# Patient Record
Sex: MALE | Race: ASIAN | NOT HISPANIC OR LATINO | Employment: OTHER | URBAN - METROPOLITAN AREA
[De-identification: names, ages, dates, MRNs, and addresses within clinical notes are randomized per-mention and may not be internally consistent; named-entity substitution may affect disease eponyms.]

---

## 2021-08-21 ENCOUNTER — CLINICAL SUPPORT (OUTPATIENT)
Dept: URGENT CARE | Facility: CLINIC | Age: 56
End: 2021-08-21
Payer: COMMERCIAL

## 2021-08-21 DIAGNOSIS — Z11.52 ENCOUNTER FOR SCREENING FOR COVID-19: Primary | ICD-10-CM

## 2021-08-21 LAB
CTP QC/QA: YES
SARS-COV-2 RDRP RESP QL NAA+PROBE: NEGATIVE

## 2021-08-21 PROCEDURE — 99211 PR OFFICE/OUTPT VISIT, EST, LEVL I: ICD-10-PCS | Mod: S$GLB,CS,, | Performed by: NURSE PRACTITIONER

## 2021-08-21 PROCEDURE — U0002: ICD-10-PCS | Mod: QW,S$GLB,, | Performed by: NURSE PRACTITIONER

## 2021-08-21 PROCEDURE — U0002 COVID-19 LAB TEST NON-CDC: HCPCS | Mod: QW,S$GLB,, | Performed by: NURSE PRACTITIONER

## 2021-08-21 PROCEDURE — 99211 OFF/OP EST MAY X REQ PHY/QHP: CPT | Mod: S$GLB,CS,, | Performed by: NURSE PRACTITIONER

## 2021-09-22 DIAGNOSIS — I34.0 MITRAL VALVE REGURGITATION: Primary | ICD-10-CM

## 2022-01-05 ENCOUNTER — LAB VISIT (OUTPATIENT)
Dept: PRIMARY CARE CLINIC | Facility: OTHER | Age: 57
End: 2022-01-05
Attending: INTERNAL MEDICINE
Payer: COMMERCIAL

## 2022-01-05 DIAGNOSIS — Z20.822 ENCOUNTER FOR LABORATORY TESTING FOR COVID-19 VIRUS: ICD-10-CM

## 2022-01-05 PROCEDURE — U0003 INFECTIOUS AGENT DETECTION BY NUCLEIC ACID (DNA OR RNA); SEVERE ACUTE RESPIRATORY SYNDROME CORONAVIRUS 2 (SARS-COV-2) (CORONAVIRUS DISEASE [COVID-19]), AMPLIFIED PROBE TECHNIQUE, MAKING USE OF HIGH THROUGHPUT TECHNOLOGIES AS DESCRIBED BY CMS-2020-01-R: HCPCS | Mod: ST72 | Performed by: INTERNAL MEDICINE

## 2022-01-10 LAB — SARS-COV-2 RNA RESP QL NAA+PROBE: NOT DETECTED

## 2022-12-16 DIAGNOSIS — I34.0 MITRAL VALVE INSUFFICIENCY: Primary | ICD-10-CM

## 2023-03-01 ENCOUNTER — OFFICE VISIT (OUTPATIENT)
Dept: CARDIOLOGY | Facility: CLINIC | Age: 58
End: 2023-03-01
Payer: COMMERCIAL

## 2023-03-01 VITALS
WEIGHT: 177.94 LBS | HEART RATE: 97 BPM | DIASTOLIC BLOOD PRESSURE: 80 MMHG | HEIGHT: 66 IN | OXYGEN SATURATION: 95 % | SYSTOLIC BLOOD PRESSURE: 124 MMHG | BODY MASS INDEX: 28.6 KG/M2

## 2023-03-01 DIAGNOSIS — I34.0 MITRAL VALVE INSUFFICIENCY: ICD-10-CM

## 2023-03-01 DIAGNOSIS — I10 PRIMARY HYPERTENSION: ICD-10-CM

## 2023-03-01 DIAGNOSIS — R94.31 ABNORMAL EKG: ICD-10-CM

## 2023-03-01 DIAGNOSIS — Z87.891 EX-SMOKER: ICD-10-CM

## 2023-03-01 DIAGNOSIS — I25.9 CHEST PAIN DUE TO MYOCARDIAL ISCHEMIA, UNSPECIFIED ISCHEMIC CHEST PAIN TYPE: ICD-10-CM

## 2023-03-01 DIAGNOSIS — E78.2 MIXED HYPERLIPIDEMIA: ICD-10-CM

## 2023-03-01 PROCEDURE — 3008F BODY MASS INDEX DOCD: CPT | Mod: CPTII,S$GLB,, | Performed by: INTERNAL MEDICINE

## 2023-03-01 PROCEDURE — 99204 OFFICE O/P NEW MOD 45 MIN: CPT | Mod: S$GLB,,, | Performed by: INTERNAL MEDICINE

## 2023-03-01 PROCEDURE — 3079F PR MOST RECENT DIASTOLIC BLOOD PRESSURE 80-89 MM HG: ICD-10-PCS | Mod: CPTII,S$GLB,, | Performed by: INTERNAL MEDICINE

## 2023-03-01 PROCEDURE — 93000 ELECTROCARDIOGRAM COMPLETE: CPT | Mod: S$GLB,,, | Performed by: INTERNAL MEDICINE

## 2023-03-01 PROCEDURE — 1159F PR MEDICATION LIST DOCUMENTED IN MEDICAL RECORD: ICD-10-PCS | Mod: CPTII,S$GLB,, | Performed by: INTERNAL MEDICINE

## 2023-03-01 PROCEDURE — 1159F MED LIST DOCD IN RCRD: CPT | Mod: CPTII,S$GLB,, | Performed by: INTERNAL MEDICINE

## 2023-03-01 PROCEDURE — 93000 EKG 12-LEAD: ICD-10-PCS | Mod: S$GLB,,, | Performed by: INTERNAL MEDICINE

## 2023-03-01 PROCEDURE — 3008F PR BODY MASS INDEX (BMI) DOCUMENTED: ICD-10-PCS | Mod: CPTII,S$GLB,, | Performed by: INTERNAL MEDICINE

## 2023-03-01 PROCEDURE — 3074F PR MOST RECENT SYSTOLIC BLOOD PRESSURE < 130 MM HG: ICD-10-PCS | Mod: CPTII,S$GLB,, | Performed by: INTERNAL MEDICINE

## 2023-03-01 PROCEDURE — 3074F SYST BP LT 130 MM HG: CPT | Mod: CPTII,S$GLB,, | Performed by: INTERNAL MEDICINE

## 2023-03-01 PROCEDURE — 99999 PR PBB SHADOW E&M-EST. PATIENT-LVL III: CPT | Mod: PBBFAC,,, | Performed by: INTERNAL MEDICINE

## 2023-03-01 PROCEDURE — 99204 PR OFFICE/OUTPT VISIT, NEW, LEVL IV, 45-59 MIN: ICD-10-PCS | Mod: S$GLB,,, | Performed by: INTERNAL MEDICINE

## 2023-03-01 PROCEDURE — 3079F DIAST BP 80-89 MM HG: CPT | Mod: CPTII,S$GLB,, | Performed by: INTERNAL MEDICINE

## 2023-03-01 PROCEDURE — 99999 PR PBB SHADOW E&M-EST. PATIENT-LVL III: ICD-10-PCS | Mod: PBBFAC,,, | Performed by: INTERNAL MEDICINE

## 2023-03-01 RX ORDER — ATORVASTATIN CALCIUM 20 MG/1
20 TABLET, FILM COATED ORAL DAILY
COMMUNITY
Start: 2023-02-16

## 2023-03-01 RX ORDER — AMLODIPINE BESYLATE 5 MG/1
5 TABLET ORAL DAILY
COMMUNITY
Start: 2023-02-16

## 2023-03-01 RX ORDER — NITROGLYCERIN 0.4 MG/1
0.4 TABLET SUBLINGUAL EVERY 5 MIN PRN
Qty: 30 TABLET | Refills: 3 | Status: SHIPPED | OUTPATIENT
Start: 2023-03-01 | End: 2024-02-29

## 2023-03-01 NOTE — PATIENT INSTRUCTIONS
Start aspirin 81mg once a day.  Take nitroglycerin (1 tab under tongue every 5 minutes) as needed for chest pain.    Procedure: Coronary Angiogram  Procedure date: March 20, 2023  Procedure time: 8am    Arrive at the Outpatient Surgery Unit at [facility]  Nothing to eat or drink after midnight.  Take all morning medication with a sip of water.  If you are diabetic, do not take any diabetes medication the morning of the procedure.   Please make arrangements for a family member or friend to bring you home after the procedure. You will not be able to drive home.      If you have any questions, please call our office at [phone].

## 2023-03-01 NOTE — PROGRESS NOTES
Cardiology    3/1/2023  10:41 AM    Problem list  Patient Active Problem List   Diagnosis    Primary hypertension    Mixed hyperlipidemia    Ex-smoker    Chest pain due to myocardial ischemia    Abnormal EKG       CC:  Chest pain    HPI:  Patient is referred for evaluation of chest pain.  Patient stated that in 2021, he noticed that he started having chest tightness when he starts walking.  His chest tightness will improve if he takes a rest.  He has not seen a cardiologist.  He denies any pain at rest.  He also has chest tightness when he cuts his grass.  There is no family history for premature coronary disease.  He quit smoking 6 months ago.  He drinks alcohol on occasion.      Medications  Current Outpatient Medications   Medication Sig Dispense Refill    amLODIPine (NORVASC) 5 MG tablet Take 5 mg by mouth once daily.      atorvastatin (LIPITOR) 20 MG tablet Take 20 mg by mouth once daily.      nitroGLYCERIN (NITROSTAT) 0.4 MG SL tablet Place 1 tablet (0.4 mg total) under the tongue every 5 (five) minutes as needed for Chest pain. 30 tablet 3     No current facility-administered medications for this visit.      Prior to Admission medications    Medication Sig Start Date End Date Taking? Authorizing Provider   amLODIPine (NORVASC) 5 MG tablet Take 5 mg by mouth once daily. 2/16/23  Yes Historical Provider   atorvastatin (LIPITOR) 20 MG tablet Take 20 mg by mouth once daily. 2/16/23  Yes Historical Provider   nitroGLYCERIN (NITROSTAT) 0.4 MG SL tablet Place 1 tablet (0.4 mg total) under the tongue every 5 (five) minutes as needed for Chest pain. 3/1/23 2/29/24  Esequiel Zamarripa MD         History  No past medical history on file.  No past surgical history on file.  Social History     Socioeconomic History    Marital status:    Tobacco Use    Smoking status: Former     Types: Cigarettes    Smokeless tobacco: Never   Substance and Sexual Activity    Alcohol use: Yes     Comment: occ    Drug use: Never          Allergies  Review of patient's allergies indicates:  No Known Allergies      Review of Systems   Review of Systems   Constitutional: Negative for decreased appetite, fever and weight loss.   HENT:  Negative for congestion and nosebleeds.    Eyes:  Negative for double vision, vision loss in left eye, vision loss in right eye and visual disturbance.   Cardiovascular:  Positive for chest pain. Negative for claudication, cyanosis, dyspnea on exertion, irregular heartbeat, leg swelling, near-syncope, orthopnea, palpitations, paroxysmal nocturnal dyspnea and syncope.   Respiratory:  Negative for cough, hemoptysis, shortness of breath, sleep disturbances due to breathing, snoring, sputum production and wheezing.    Endocrine: Negative for cold intolerance and heat intolerance.   Skin:  Negative for nail changes and rash.   Musculoskeletal:  Negative for joint pain, muscle cramps, muscle weakness and myalgias.   Gastrointestinal:  Negative for change in bowel habit, heartburn, hematemesis, hematochezia, hemorrhoids and melena.   Neurological:  Negative for dizziness, focal weakness and headaches.       Physical Exam  Wt Readings from Last 1 Encounters:   03/01/23 80.7 kg (177 lb 14.6 oz)     BP Readings from Last 3 Encounters:   03/01/23 124/80     Pulse Readings from Last 1 Encounters:   03/01/23 97     Body mass index is 28.72 kg/m².    Physical Exam  Constitutional:       Appearance: He is well-developed.   HENT:      Head: Atraumatic.   Eyes:      General: No scleral icterus.  Neck:      Vascular: Normal carotid pulses. No carotid bruit, hepatojugular reflux or JVD.   Cardiovascular:      Rate and Rhythm: Normal rate and regular rhythm.      Chest Wall: PMI is not displaced.      Pulses: Intact distal pulses.           Carotid pulses are 2+ on the right side and 2+ on the left side.       Radial pulses are 2+ on the right side and 2+ on the left side.        Dorsalis pedis pulses are 2+ on the right side and 2+  on the left side.      Heart sounds: Normal heart sounds, S1 normal and S2 normal. No murmur heard.    No friction rub.   Pulmonary:      Effort: Pulmonary effort is normal. No respiratory distress.      Breath sounds: Normal breath sounds. No stridor. No wheezing or rales.   Chest:      Chest wall: No tenderness.   Abdominal:      General: Bowel sounds are normal.      Palpations: Abdomen is soft.   Musculoskeletal:      Cervical back: Neck supple. No edema.   Skin:     General: Skin is warm and dry.      Nails: There is no clubbing.   Neurological:      Mental Status: He is alert and oriented to person, place, and time.   Psychiatric:         Behavior: Behavior normal.         Thought Content: Thought content normal.           Assessment  1. Mitral valve insufficiency  Being assessed  - Ambulatory referral/consult to Cardiology    2. Primary hypertension  Well controlled on current medication, continue to monitor    3. Mixed hyperlipidemia  On statin    4. Ex-smoker  Unchanged    5. Chest pain due to myocardial ischemia, unspecified ischemic chest pain type  New onset chest pain on exertion relieved with rest is concerning for unstable angina  - EKG 12-lead  - Echo; Future    6. Abnormal EKG  ST and T-wave changes in inferior leads        Plan and Discussion  Discussed his multiple risk factors for coronary disease (male, HTN, HLP, smoker), his new onset exertional chest pain relieved with rest is concerning for unstable angina.  Discussed his EKG which showed sinus rhythm with T-wave inversion in inferior leads.  Discussed proceeding with coronary angiogram for unstable angina.  Patient understood and agreed.  The risks, benefits, indications and alternatives of the planned procedure were discussed in details.  Discussed with patient the risks and benefits of the procedure including, but not limited to, the following; 1:1000 risk of heart attack, stroke and death with 3-5% risk of bleeding, vessel damage (in the  arms, legs or in the heart), and the need for emergent surgery, including open heart surgery.  All questions were answered.  The patient agreed to proceed.  Patient will coordinate with his wife and let us know when he can proceed with an angiogram.  Recommend to start aspirin 81 mg daily.  Recommend to take 1 sublingual nitroglycerin as needed every 5 minutes for chest pain.  Get echo.      Follow Up  1 month      Esequiel Zamarripa MD, F.A.C.C, F.S.C.A.I.        40 minutes were spent in chart review, documentation and review of results, and evaluation, treatment, and counseling of patient on the same day of service.    Disclaimer: This document was created using voice recognition software ([employer]). Although it may be edited, this document may contain errors related to incorrect recognition of the spoken word. Please call the physician if clarification is needed.

## 2023-03-02 ENCOUNTER — HOSPITAL ENCOUNTER (OUTPATIENT)
Dept: CARDIOLOGY | Facility: OTHER | Age: 58
Discharge: HOME | End: 2023-03-02
Attending: INTERNAL MEDICINE
Payer: COMMERCIAL

## 2023-03-02 VITALS
BODY MASS INDEX: 28.45 KG/M2 | SYSTOLIC BLOOD PRESSURE: 124 MMHG | HEIGHT: 66 IN | WEIGHT: 177 LBS | DIASTOLIC BLOOD PRESSURE: 80 MMHG

## 2023-03-02 DIAGNOSIS — I25.9 CHEST PAIN DUE TO MYOCARDIAL ISCHEMIA, UNSPECIFIED ISCHEMIC CHEST PAIN TYPE: ICD-10-CM

## 2023-03-02 LAB
ASCENDING AORTA: 3.19 CM
AV INDEX (PROSTH): 0.93
AV MEAN GRADIENT: 3 MMHG
AV PEAK GRADIENT: 6 MMHG
AV VALVE AREA: 3.17 CM2
AV VELOCITY RATIO: 0.93
BSA FOR ECHO PROCEDURE: 1.93 M2
CV ECHO LV RWT: 0.38 CM
DOP CALC AO PEAK VEL: 1.23 M/S
DOP CALC AO VTI: 22.8 CM
DOP CALC LVOT AREA: 3.4 CM2
DOP CALC LVOT DIAMETER: 2.09 CM
DOP CALC LVOT PEAK VEL: 1.15 M/S
DOP CALC LVOT STROKE VOLUME: 72.35 CM3
DOP CALCLVOT PEAK VEL VTI: 21.1 CM
E WAVE DECELERATION TIME: 143.89 MSEC
E/A RATIO: 0.85
E/E' RATIO: 7.16 M/S
ECHO LV POSTERIOR WALL: 1 CM (ref 0.6–1.1)
EJECTION FRACTION: 68 %
FRACTIONAL SHORTENING: 38 % (ref 28–44)
INTERVENTRICULAR SEPTUM: 0.9 CM (ref 0.6–1.1)
IVC DIAMETER: 14 CM
IVRT: 122.26 MSEC
LA MAJOR: 5.29 CM
LA MINOR: 5.51 CM
LA WIDTH: 3.5 CM
LEFT ATRIUM SIZE: 4.44 CM
LEFT ATRIUM VOLUME INDEX MOD: 24.2 ML/M2
LEFT ATRIUM VOLUME INDEX: 37.5 ML/M2
LEFT ATRIUM VOLUME MOD: 46 CM3
LEFT ATRIUM VOLUME: 71.3 CM3
LEFT INTERNAL DIMENSION IN SYSTOLE: 3.22 CM (ref 2.1–4)
LEFT VENTRICLE DIASTOLIC VOLUME INDEX: 68.11 ML/M2
LEFT VENTRICLE DIASTOLIC VOLUME: 129.41 ML
LEFT VENTRICLE MASS INDEX: 95 G/M2
LEFT VENTRICLE SYSTOLIC VOLUME INDEX: 22 ML/M2
LEFT VENTRICLE SYSTOLIC VOLUME: 41.71 ML
LEFT VENTRICULAR INTERNAL DIMENSION IN DIASTOLE: 5.2 CM (ref 3.5–6)
LEFT VENTRICULAR MASS: 181.4 G
LV LATERAL E/E' RATIO: 6.8 M/S
LV SEPTAL E/E' RATIO: 7.56 M/S
LVOT MG: 2.66 MMHG
LVOT MV: 0.76 CM/S
MV PEAK A VEL: 0.8 M/S
MV PEAK E VEL: 0.68 M/S
MV STENOSIS PRESSURE HALF TIME: 41.73 MS
MV VALVE AREA P 1/2 METHOD: 5.27 CM2
PISA MRMAX VEL: 4.84 M/S
PISA TR MAX VEL: 2.89 M/S
PULM VEIN S/D RATIO: 1.47
PV PEAK D VEL: 0.38 M/S
PV PEAK S VEL: 0.56 M/S
PV PEAK VELOCITY: 1.11 CM/S
RA MAJOR: 4.4 CM
RA PRESSURE: 3 MMHG
RA WIDTH: 3.4 CM
RIGHT VENTRICULAR END-DIASTOLIC DIMENSION: 3.65 CM
RV TISSUE DOPPLER FREE WALL SYSTOLIC VELOCITY 1 (APICAL 4 CHAMBER VIEW): 14 CM/S
SINUS: 3.4 CM
STJ: 2.93 CM
TDI LATERAL: 0.1 M/S
TDI SEPTAL: 0.09 M/S
TDI: 0.1 M/S
TR MAX PG: 33 MMHG
TRICUSPID ANNULAR PLANE SYSTOLIC EXCURSION: 1.8 CM
TV REST PULMONARY ARTERY PRESSURE: 36 MMHG

## 2023-03-02 PROCEDURE — 93306 TTE W/DOPPLER COMPLETE: CPT | Mod: 26,,, | Performed by: INTERNAL MEDICINE

## 2023-03-02 PROCEDURE — 93306 TTE W/DOPPLER COMPLETE: CPT

## 2023-03-02 PROCEDURE — 93306 ECHO (CUPID ONLY): ICD-10-PCS | Mod: 26,,, | Performed by: INTERNAL MEDICINE

## 2023-03-23 DIAGNOSIS — I25.9 CHEST PAIN DUE TO MYOCARDIAL ISCHEMIA, UNSPECIFIED ISCHEMIC CHEST PAIN TYPE: Primary | ICD-10-CM

## 2023-03-23 DIAGNOSIS — Z01.818 PRE-OP TESTING: ICD-10-CM

## 2023-03-23 DIAGNOSIS — I25.10 CORONARY ARTERY DISEASE, UNSPECIFIED VESSEL OR LESION TYPE, UNSPECIFIED WHETHER ANGINA PRESENT, UNSPECIFIED WHETHER NATIVE OR TRANSPLANTED HEART: ICD-10-CM

## 2023-03-23 DIAGNOSIS — I25.10 CORONARY ARTERY DISEASE, UNSPECIFIED VESSEL OR LESION TYPE, UNSPECIFIED WHETHER ANGINA PRESENT, UNSPECIFIED WHETHER NATIVE OR TRANSPLANTED HEART: Primary | ICD-10-CM

## 2023-03-23 DIAGNOSIS — Z87.19 HISTORY OF CIRRHOSIS OF LIVER: ICD-10-CM

## 2023-03-24 ENCOUNTER — TELEPHONE (OUTPATIENT)
Dept: CARDIOTHORACIC SURGERY | Facility: CLINIC | Age: 58
End: 2023-03-24
Payer: COMMERCIAL

## 2023-03-24 NOTE — TELEPHONE ENCOUNTER
Called to notify pt that Dr. Mohna reviewed pt's CT scan from today and stated that pt does not need to have Carotid US and PFTs performed on 3/27 before his appt with Dr. Mohan.  Spoke with pt's wife and informed her of above which she verbalized understanding to.  Pt's wife also reminded of pt's appt with Dr. Mohan at 11:30 on 3/27, which she verbalized understanding to.

## 2023-03-27 ENCOUNTER — OFFICE VISIT (OUTPATIENT)
Dept: CARDIOTHORACIC SURGERY | Facility: CLINIC | Age: 58
End: 2023-03-27
Payer: COMMERCIAL

## 2023-03-27 VITALS
HEIGHT: 67 IN | OXYGEN SATURATION: 99 % | SYSTOLIC BLOOD PRESSURE: 130 MMHG | RESPIRATION RATE: 18 BRPM | DIASTOLIC BLOOD PRESSURE: 76 MMHG | WEIGHT: 175.63 LBS | HEART RATE: 74 BPM | BODY MASS INDEX: 27.56 KG/M2

## 2023-03-27 DIAGNOSIS — I20.0 UNSTABLE ANGINA PECTORIS: ICD-10-CM

## 2023-03-27 DIAGNOSIS — Z87.19 HISTORY OF CIRRHOSIS OF LIVER: Primary | ICD-10-CM

## 2023-03-27 PROCEDURE — 99999 PR PBB SHADOW E&M-EST. PATIENT-LVL III: CPT | Mod: PBBFAC,,, | Performed by: THORACIC SURGERY (CARDIOTHORACIC VASCULAR SURGERY)

## 2023-03-27 PROCEDURE — 3008F BODY MASS INDEX DOCD: CPT | Mod: CPTII,S$GLB,, | Performed by: THORACIC SURGERY (CARDIOTHORACIC VASCULAR SURGERY)

## 2023-03-27 PROCEDURE — 99999 PR PBB SHADOW E&M-EST. PATIENT-LVL III: ICD-10-PCS | Mod: PBBFAC,,, | Performed by: THORACIC SURGERY (CARDIOTHORACIC VASCULAR SURGERY)

## 2023-03-27 PROCEDURE — 1160F RVW MEDS BY RX/DR IN RCRD: CPT | Mod: CPTII,S$GLB,, | Performed by: THORACIC SURGERY (CARDIOTHORACIC VASCULAR SURGERY)

## 2023-03-27 PROCEDURE — 99205 PR OFFICE/OUTPT VISIT, NEW, LEVL V, 60-74 MIN: ICD-10-PCS | Mod: S$GLB,,, | Performed by: THORACIC SURGERY (CARDIOTHORACIC VASCULAR SURGERY)

## 2023-03-27 PROCEDURE — 3078F DIAST BP <80 MM HG: CPT | Mod: CPTII,S$GLB,, | Performed by: THORACIC SURGERY (CARDIOTHORACIC VASCULAR SURGERY)

## 2023-03-27 PROCEDURE — 3008F PR BODY MASS INDEX (BMI) DOCUMENTED: ICD-10-PCS | Mod: CPTII,S$GLB,, | Performed by: THORACIC SURGERY (CARDIOTHORACIC VASCULAR SURGERY)

## 2023-03-27 PROCEDURE — 3075F PR MOST RECENT SYSTOLIC BLOOD PRESS GE 130-139MM HG: ICD-10-PCS | Mod: CPTII,S$GLB,, | Performed by: THORACIC SURGERY (CARDIOTHORACIC VASCULAR SURGERY)

## 2023-03-27 PROCEDURE — 1159F MED LIST DOCD IN RCRD: CPT | Mod: CPTII,S$GLB,, | Performed by: THORACIC SURGERY (CARDIOTHORACIC VASCULAR SURGERY)

## 2023-03-27 PROCEDURE — 1160F PR REVIEW ALL MEDS BY PRESCRIBER/CLIN PHARMACIST DOCUMENTED: ICD-10-PCS | Mod: CPTII,S$GLB,, | Performed by: THORACIC SURGERY (CARDIOTHORACIC VASCULAR SURGERY)

## 2023-03-27 PROCEDURE — 3075F SYST BP GE 130 - 139MM HG: CPT | Mod: CPTII,S$GLB,, | Performed by: THORACIC SURGERY (CARDIOTHORACIC VASCULAR SURGERY)

## 2023-03-27 PROCEDURE — 3078F PR MOST RECENT DIASTOLIC BLOOD PRESSURE < 80 MM HG: ICD-10-PCS | Mod: CPTII,S$GLB,, | Performed by: THORACIC SURGERY (CARDIOTHORACIC VASCULAR SURGERY)

## 2023-03-27 PROCEDURE — 1159F PR MEDICATION LIST DOCUMENTED IN MEDICAL RECORD: ICD-10-PCS | Mod: CPTII,S$GLB,, | Performed by: THORACIC SURGERY (CARDIOTHORACIC VASCULAR SURGERY)

## 2023-03-27 PROCEDURE — 99205 OFFICE O/P NEW HI 60 MIN: CPT | Mod: S$GLB,,, | Performed by: THORACIC SURGERY (CARDIOTHORACIC VASCULAR SURGERY)

## 2023-03-27 NOTE — PROGRESS NOTES
Subjective:      Patient ID: Orlin Zamarripa is a 57 y.o. male.    Chief Complaint: No chief complaint on file.      HPI:  Orlin Zamarripa is a 57 y.o. male who presents for initial surgical evaluation for CAD. Medical conditions include cirrhosis ( 2013), HTN, HLD, ex-smoker. Patient endorse chest pain. Patient stated that in 2021, he noticed that he started having chest tightness when he starts walking.  His chest tightness will improve if he takes a rest. He denies any pain at rest.  He also has chest tightness when he cuts his grass.  With associated SOB, fatigue. Denies palpitations, LE swelling, orthopnea, presyncope and syncope. There is no family history for premature coronary disease.  He quit smoking 6 months ago.  He drinks alcohol on occasion.     Of note, patient has cirrhosis that was diagnoses in 2013. Reports stating that patient was on antiviral medications for  HVC but stopped after 10 months of therapy because he was out of medications. Since then has not followed back regarding issue. Recent CT scan showed cirrhosis. Patient and wife did not know he has cirrhosis, this is new news to them.        Family and social history reviewed    Review of patient's allergies indicates:  No Known Allergies  Past Medical History:   Diagnosis Date    Hypertension     Mixed hyperlipidemia      Past Surgical History:   Procedure Laterality Date    CARDIAC CATHETERIZATION  03/20/2023    CORONARY ANGIOGRAPHY Right 3/20/2023    Procedure: ANGIOGRAM, CORONARY ARTERY;  Surgeon: Esequiel Zamarripa MD;  Location: [facility] CATH LAB;  Service: Cardiology;  Laterality: Right;  radial     Family History    None       Social History     Socioeconomic History    Marital status:    Tobacco Use    Smoking status: Former     Types: Cigarettes    Smokeless tobacco: Never   Substance and Sexual Activity    Alcohol use: Not Currently     Comment: occ    Drug use: Never     Current Outpatient Medications on File Prior to Visit   Medication  Sig Dispense Refill    amLODIPine (NORVASC) 5 MG tablet Take 5 mg by mouth once daily.      aspirin (ECOTRIN) 81 MG EC tablet Take 81 mg by mouth once daily.      atorvastatin (LIPITOR) 20 MG tablet Take 20 mg by mouth once daily.      nitroGLYCERIN (NITROSTAT) 0.4 MG SL tablet Place 1 tablet (0.4 mg total) under the tongue every 5 (five) minutes as needed for Chest pain. 30 tablet 3     No current facility-administered medications on file prior to visit.       Current medications Reviewed    Review of Systems   Constitutional:  Positive for fatigue. Negative for activity change, appetite change and fever.   HENT:  Negative for nosebleeds.    Respiratory:  Positive for shortness of breath. Negative for cough.    Cardiovascular:  Positive for chest pain (discomfort/tightness). Negative for palpitations and leg swelling.   Gastrointestinal:  Negative for abdominal distention, abdominal pain and nausea.   Genitourinary:  Negative for frequency.   Musculoskeletal:  Negative for arthralgias and myalgias.   Skin:  Negative for rash.   Neurological:  Negative for dizziness and numbness.   Hematological:  Does not bruise/bleed easily.   Objective:   Physical Exam  Vitals reviewed.   Constitutional:       Appearance: Normal appearance.   HENT:      Head: Normocephalic and atraumatic.   Eyes:      Extraocular Movements: Extraocular movements intact.   Cardiovascular:      Rate and Rhythm: Normal rate and regular rhythm.      Heart sounds: Normal heart sounds.   Pulmonary:      Effort: Pulmonary effort is normal.      Breath sounds: Normal breath sounds.   Abdominal:      General: Abdomen is flat.      Palpations: Abdomen is soft.   Musculoskeletal:         General: Normal range of motion.      Cervical back: Normal range of motion.   Skin:     General: Skin is warm and dry.      Capillary Refill: Capillary refill takes less than 2 seconds.   Neurological:      General: No focal deficit present.      Mental Status: He is  alert.       Diagnostic Results:   CT c/a/p  No acute intrathoracic process.   Coronary artery calcifications.   CT abdomen/pelvis:   Findings of hepatic cirrhosis.  No discrete hepatic lesion.     Diverticulosis coli without evidence of acute diverticulitis.  [facility] 3/20/2023    The Prox RCA lesion was 100% stenosed.    The Mid Cx lesion was 70% stenosed.    The Lat 1st Mrg lesion was 80% stenosed.    The 1st Mrg lesion was 70% stenosed.    The Mid LAD lesion was 95% stenosed.    There was three vessel coronary artery disease.  Refer to CT surgery.    Echo 3/2/2023  Mild left atrial enlargement.  Normal central venous pressure (3 mmHg).  The estimated PA systolic pressure is 36 mmHg.  The left ventricle is normal in size with normal systolic function.  The estimated ejection fraction is 68%.  Indeterminate left ventricular diastolic function.  Normal right ventricular size with normal right ventricular systolic function.  LVIDD 5.2  Aortic sinus 3.4     Assessment:   CAD   Plan:   Patient not a surgical candidate due to history of cirrhosis. He was diagnoses with cirrhosis in 2013 and was treated on antiviral medication for HCV for 10 months?. Repeat CT can showed cirrhosis. Patient has not had hepatology f/u since 2013, will send referral to hepatology and send him to see Dr. Hinton for PCI.

## 2023-03-28 ENCOUNTER — TELEPHONE (OUTPATIENT)
Dept: HEPATOLOGY | Facility: CLINIC | Age: 58
End: 2023-03-28
Payer: COMMERCIAL

## 2023-03-28 NOTE — TELEPHONE ENCOUNTER
----- Message from Mimi Chan RN sent at 3/27/2023 12:27 PM CDT -----  Good afternoon,     Dr. Mohan saw Mr. Zamarripa this morning for CABG evaluation and due to his history of Hepatitis C, Dr. Mohan would like Mr. Zamarripa to be seen by Hepatology.  Can you please contact Mr. Zamarripa to schedule an appt as soon as possible?  A referral was entered into Epic.    Thank you,     Mimi Chan RN

## 2023-03-28 NOTE — TELEPHONE ENCOUNTER
Telephone call to patient and spoke with the patient wife. Scheduled hepatology appt on 5/31. Mailed appt reminder to patient.

## 2023-04-13 ENCOUNTER — DOCUMENTATION ONLY (OUTPATIENT)
Dept: CARDIOLOGY | Facility: CLINIC | Age: 58
End: 2023-04-13
Payer: COMMERCIAL

## 2023-04-13 ENCOUNTER — LAB VISIT (OUTPATIENT)
Dept: LAB | Facility: HOSPITAL | Age: 58
End: 2023-04-13
Payer: COMMERCIAL

## 2023-04-13 ENCOUNTER — OFFICE VISIT (OUTPATIENT)
Dept: CARDIOLOGY | Facility: CLINIC | Age: 58
End: 2023-04-13
Payer: COMMERCIAL

## 2023-04-13 VITALS
SYSTOLIC BLOOD PRESSURE: 131 MMHG | OXYGEN SATURATION: 98 % | DIASTOLIC BLOOD PRESSURE: 82 MMHG | HEIGHT: 67 IN | BODY MASS INDEX: 27.64 KG/M2 | HEART RATE: 66 BPM | WEIGHT: 176.13 LBS

## 2023-04-13 DIAGNOSIS — K74.60 HEPATIC CIRRHOSIS, UNSPECIFIED HEPATIC CIRRHOSIS TYPE, UNSPECIFIED WHETHER ASCITES PRESENT: ICD-10-CM

## 2023-04-13 DIAGNOSIS — B19.20 HEPATITIS C VIRUS INFECTION WITHOUT HEPATIC COMA, UNSPECIFIED CHRONICITY: ICD-10-CM

## 2023-04-13 DIAGNOSIS — D69.6 THROMBOCYTOPENIA: ICD-10-CM

## 2023-04-13 DIAGNOSIS — I10 PRIMARY HYPERTENSION: ICD-10-CM

## 2023-04-13 DIAGNOSIS — E78.2 MIXED HYPERLIPIDEMIA: ICD-10-CM

## 2023-04-13 DIAGNOSIS — I25.118 CORONARY ARTERY DISEASE OF NATIVE ARTERY OF NATIVE HEART WITH STABLE ANGINA PECTORIS: ICD-10-CM

## 2023-04-13 DIAGNOSIS — I25.118 CORONARY ARTERY DISEASE OF NATIVE ARTERY OF NATIVE HEART WITH STABLE ANGINA PECTORIS: Primary | ICD-10-CM

## 2023-04-13 LAB
ABO + RH BLD: NORMAL
ANION GAP SERPL CALC-SCNC: 8 MMOL/L (ref 8–16)
BASOPHILS # BLD AUTO: 0.01 K/UL (ref 0–0.2)
BASOPHILS NFR BLD: 0.3 % (ref 0–1.9)
BLD GP AB SCN CELLS X3 SERPL QL: NORMAL
BUN SERPL-MCNC: 16 MG/DL (ref 6–20)
CALCIUM SERPL-MCNC: 9.1 MG/DL (ref 8.7–10.5)
CHLORIDE SERPL-SCNC: 104 MMOL/L (ref 95–110)
CO2 SERPL-SCNC: 26 MMOL/L (ref 23–29)
CREAT SERPL-MCNC: 0.9 MG/DL (ref 0.5–1.4)
DIFFERENTIAL METHOD: ABNORMAL
EOSINOPHIL # BLD AUTO: 0.1 K/UL (ref 0–0.5)
EOSINOPHIL NFR BLD: 1.3 % (ref 0–8)
ERYTHROCYTE [DISTWIDTH] IN BLOOD BY AUTOMATED COUNT: 12.3 % (ref 11.5–14.5)
EST. GFR  (NO RACE VARIABLE): >60 ML/MIN/1.73 M^2
GLUCOSE SERPL-MCNC: 105 MG/DL (ref 70–110)
HCT VFR BLD AUTO: 42.4 % (ref 40–54)
HGB BLD-MCNC: 14.3 G/DL (ref 14–18)
IMM GRANULOCYTES # BLD AUTO: 0.01 K/UL (ref 0–0.04)
IMM GRANULOCYTES NFR BLD AUTO: 0.3 % (ref 0–0.5)
LYMPHOCYTES # BLD AUTO: 1.4 K/UL (ref 1–4.8)
LYMPHOCYTES NFR BLD: 35.5 % (ref 18–48)
MCH RBC QN AUTO: 31.9 PG (ref 27–31)
MCHC RBC AUTO-ENTMCNC: 33.7 G/DL (ref 32–36)
MCV RBC AUTO: 95 FL (ref 82–98)
MONOCYTES # BLD AUTO: 0.4 K/UL (ref 0.3–1)
MONOCYTES NFR BLD: 9.2 % (ref 4–15)
NEUTROPHILS # BLD AUTO: 2 K/UL (ref 1.8–7.7)
NEUTROPHILS NFR BLD: 53.4 % (ref 38–73)
NRBC BLD-RTO: 0 /100 WBC
PLATELET # BLD AUTO: 145 K/UL (ref 150–450)
PMV BLD AUTO: 9.2 FL (ref 9.2–12.9)
POTASSIUM SERPL-SCNC: 4.1 MMOL/L (ref 3.5–5.1)
RBC # BLD AUTO: 4.48 M/UL (ref 4.6–6.2)
SODIUM SERPL-SCNC: 138 MMOL/L (ref 136–145)
SPECIMEN OUTDATE: NORMAL
WBC # BLD AUTO: 3.8 K/UL (ref 3.9–12.7)

## 2023-04-13 PROCEDURE — 36415 COLL VENOUS BLD VENIPUNCTURE: CPT | Performed by: STUDENT IN AN ORGANIZED HEALTH CARE EDUCATION/TRAINING PROGRAM

## 2023-04-13 PROCEDURE — 1159F PR MEDICATION LIST DOCUMENTED IN MEDICAL RECORD: ICD-10-PCS | Mod: CPTII,S$GLB,, | Performed by: INTERNAL MEDICINE

## 2023-04-13 PROCEDURE — 3008F PR BODY MASS INDEX (BMI) DOCUMENTED: ICD-10-PCS | Mod: CPTII,S$GLB,, | Performed by: INTERNAL MEDICINE

## 2023-04-13 PROCEDURE — 3079F DIAST BP 80-89 MM HG: CPT | Mod: CPTII,S$GLB,, | Performed by: INTERNAL MEDICINE

## 2023-04-13 PROCEDURE — 1159F MED LIST DOCD IN RCRD: CPT | Mod: CPTII,S$GLB,, | Performed by: INTERNAL MEDICINE

## 2023-04-13 PROCEDURE — 3079F PR MOST RECENT DIASTOLIC BLOOD PRESSURE 80-89 MM HG: ICD-10-PCS | Mod: CPTII,S$GLB,, | Performed by: INTERNAL MEDICINE

## 2023-04-13 PROCEDURE — 80048 BASIC METABOLIC PNL TOTAL CA: CPT | Performed by: INTERNAL MEDICINE

## 2023-04-13 PROCEDURE — 3008F BODY MASS INDEX DOCD: CPT | Mod: CPTII,S$GLB,, | Performed by: INTERNAL MEDICINE

## 2023-04-13 PROCEDURE — 99214 PR OFFICE/OUTPT VISIT, EST, LEVL IV, 30-39 MIN: ICD-10-PCS | Mod: S$GLB,,, | Performed by: INTERNAL MEDICINE

## 2023-04-13 PROCEDURE — 3075F PR MOST RECENT SYSTOLIC BLOOD PRESS GE 130-139MM HG: ICD-10-PCS | Mod: CPTII,S$GLB,, | Performed by: INTERNAL MEDICINE

## 2023-04-13 PROCEDURE — 99999 PR PBB SHADOW E&M-EST. PATIENT-LVL III: CPT | Mod: PBBFAC,,, | Performed by: INTERNAL MEDICINE

## 2023-04-13 PROCEDURE — 3075F SYST BP GE 130 - 139MM HG: CPT | Mod: CPTII,S$GLB,, | Performed by: INTERNAL MEDICINE

## 2023-04-13 PROCEDURE — 99214 OFFICE O/P EST MOD 30 MIN: CPT | Mod: S$GLB,,, | Performed by: INTERNAL MEDICINE

## 2023-04-13 PROCEDURE — 99999 PR PBB SHADOW E&M-EST. PATIENT-LVL III: ICD-10-PCS | Mod: PBBFAC,,, | Performed by: INTERNAL MEDICINE

## 2023-04-13 PROCEDURE — 86900 BLOOD TYPING SEROLOGIC ABO: CPT | Performed by: STUDENT IN AN ORGANIZED HEALTH CARE EDUCATION/TRAINING PROGRAM

## 2023-04-13 PROCEDURE — 85025 COMPLETE CBC W/AUTO DIFF WBC: CPT | Performed by: INTERNAL MEDICINE

## 2023-04-13 RX ORDER — ISOSORBIDE MONONITRATE 30 MG/1
30 TABLET, EXTENDED RELEASE ORAL DAILY
Qty: 30 TABLET | Refills: 11 | Status: SHIPPED | OUTPATIENT
Start: 2023-04-13 | End: 2023-06-15 | Stop reason: SINTOL

## 2023-04-13 RX ORDER — DIPHENHYDRAMINE HCL 50 MG
50 CAPSULE ORAL ONCE
Status: CANCELLED | OUTPATIENT
Start: 2023-04-13 | End: 2023-04-13

## 2023-04-13 RX ORDER — CLOPIDOGREL BISULFATE 75 MG/1
TABLET ORAL
Qty: 30 TABLET | Refills: 11 | Status: SHIPPED | OUTPATIENT
Start: 2023-04-13

## 2023-04-13 RX ORDER — METOPROLOL TARTRATE 25 MG/1
12.5 TABLET, FILM COATED ORAL 2 TIMES DAILY
Qty: 30 TABLET | Refills: 11 | Status: SHIPPED | OUTPATIENT
Start: 2023-04-13 | End: 2023-04-13

## 2023-04-13 RX ORDER — SODIUM CHLORIDE 9 MG/ML
INJECTION, SOLUTION INTRAVENOUS CONTINUOUS
Status: CANCELLED | OUTPATIENT
Start: 2023-04-13 | End: 2023-04-13

## 2023-04-13 RX ORDER — SODIUM CHLORIDE 0.9 % (FLUSH) 0.9 %
10 SYRINGE (ML) INJECTION
Status: SHIPPED | OUTPATIENT
Start: 2023-04-13

## 2023-04-13 NOTE — H&P (VIEW-ONLY)
Patient ID:  Orlin Zamarripa is a 57 y.o. y.o. male who presents for evaluation coronary artery disease    Referring physician: Dr. Mohan    Orlin Zamarripa is a 57 y.o. male with HTN, HLD, CAD and former smoker who presents to IC clinic to discuss multivessel PCI. He has been having exertional chest pain for approximately 1 year, that is worsened lately.  His chest pain is relieved with rest.  He had an angiogram performed at outside hospital, which revealed  to RCA, severe stenosis to mid LAD, and severe stenosis to distal left circumflex artery.  He was evaluated by Dr. Mohan for bypass surgery, however, he was deemed not to be a candidate given cirrhosis/HCV.  He is pending follow-up with hepatology.  He states he continues to have angina with exertion, and this is severely limiting his lifestyle.  Denies palpitations, lower extremity edema, PND, orthopnea, or any other acute events.         Past Medical History:   Diagnosis Date    Hypertension     Mixed hyperlipidemia      Past Surgical History:   Procedure Laterality Date    CARDIAC CATHETERIZATION  03/20/2023    CORONARY ANGIOGRAPHY Right 3/20/2023    Procedure: ANGIOGRAM, CORONARY ARTERY;  Surgeon: Esequiel Zamarripa MD;  Location: [facility] CATH LAB;  Service: Cardiology;  Laterality: Right;  radial     Current Outpatient Medications on File Prior to Visit   Medication Sig Dispense Refill    amLODIPine (NORVASC) 5 MG tablet Take 5 mg by mouth once daily.      aspirin (ECOTRIN) 81 MG EC tablet Take 81 mg by mouth once daily.      atorvastatin (LIPITOR) 20 MG tablet Take 20 mg by mouth once daily.      nitroGLYCERIN (NITROSTAT) 0.4 MG SL tablet Place 1 tablet (0.4 mg total) under the tongue every 5 (five) minutes as needed for Chest pain. (Patient not taking: Reported on 4/13/2023) 30 tablet 3     No current facility-administered medications on file prior to visit.     Review of patient's allergies indicates:  No Known Allergies  Social History     Tobacco Use     "Smoking status: Former     Types: Cigarettes     Quit date: 2020     Years since quittin.9    Smokeless tobacco: Never   Substance Use Topics    Alcohol use: Yes     Alcohol/week: 2.0 standard drinks     Types: 1 Glasses of wine, 1 Cans of beer per week     Comment: soc    Drug use: Never     History reviewed. No pertinent family history.    Review of Systems   Cardiovascular:  Positive for chest pain.   All other systems reviewed and are negative.     Objective:     Vitals:    23 0950 23 0951   BP: 131/82 131/82   BP Location: Left arm Right arm   Patient Position: Sitting Sitting   BP Method: Large (Automatic) Large (Automatic)   Pulse: 68 66   SpO2: 98%    Weight: 79.9 kg (176 lb 2.4 oz)    Height: 5' 7" (1.702 m)          Physical Exam  Vitals and nursing note reviewed.   Constitutional:       Appearance: Normal appearance.   HENT:      Head: Normocephalic and atraumatic.      Right Ear: External ear normal.      Left Ear: External ear normal.      Nose: Nose normal.      Mouth/Throat:      Mouth: Mucous membranes are moist.   Eyes:      Extraocular Movements: Extraocular movements intact.      Pupils: Pupils are equal, round, and reactive to light.   Cardiovascular:      Rate and Rhythm: Normal rate and regular rhythm.      Pulses: Normal pulses.   Pulmonary:      Effort: Pulmonary effort is normal.   Abdominal:      General: Abdomen is flat.   Musculoskeletal:         General: Normal range of motion.      Cervical back: Normal range of motion.      Right lower leg: No edema.      Left lower leg: No edema.   Skin:     General: Skin is warm and dry.      Capillary Refill: Capillary refill takes less than 2 seconds.   Neurological:      General: No focal deficit present.      Mental Status: He is alert and oriented to person, place, and time. Mental status is at baseline.     Labs:     Lab Results   Component Value Date     2023    K 4.4 2023     2023    CO2 26 " 03/17/2023    BUN 19 03/17/2023    CREATININE 1.0 03/17/2023    ANIONGAP 8 03/17/2023     No results found for: HGBA1C  No results found for: BNP, BNPTRIAGEBLO    Lab Results   Component Value Date    WBC 3.68 (L) 03/17/2023    HGB 14.1 03/17/2023    HCT 40.8 03/17/2023     (L) 03/17/2023     Lab Results   Component Value Date    CHOL 104 (L) 03/17/2023    HDL 35 (L) 03/17/2023    LDLCALC 54.0 (L) 03/17/2023    TRIG 75 03/17/2023       Meds:     Current Outpatient Medications:     amLODIPine (NORVASC) 5 MG tablet, Take 5 mg by mouth once daily., Disp: , Rfl:     aspirin (ECOTRIN) 81 MG EC tablet, Take 81 mg by mouth once daily., Disp: , Rfl:     atorvastatin (LIPITOR) 20 MG tablet, Take 20 mg by mouth once daily., Disp: , Rfl:     clopidogreL (PLAVIX) 75 mg tablet, Take 4 tablets (300mg) this evening, followed by 1 tablet (75mg) thereafter., Disp: 30 tablet, Rfl: 11    isosorbide mononitrate (IMDUR) 30 MG 24 hr tablet, Take 1 tablet (30 mg total) by mouth once daily., Disp: 30 tablet, Rfl: 11    nitroGLYCERIN (NITROSTAT) 0.4 MG SL tablet, Place 1 tablet (0.4 mg total) under the tongue every 5 (five) minutes as needed for Chest pain. (Patient not taking: Reported on 4/13/2023), Disp: 30 tablet, Rfl: 3    Current Facility-Administered Medications:     sodium chloride 0.9% flush 10 mL, 10 mL, Intravenous, PRN, Cristofer Hernandez MD      Assessment & Plan:     Coronary artery disease of native artery of native heart with stable angina pectoris  Recurrent angina with exertion and turned down for CABG due to cirrhosis.     --LHC +/- PCI, patient is a SAMRA candidate  - Anti-platelet Therapy: ASA / clopidogrel   - Access: Right radial   - Creatinine/CrCl: 1.0  - Allergies: No shellfish / Iodine allergy  - Pre-Hydration: NS  - Pre-Op Med: Bendaryl 50mg pO   - All patient's questions were answered.  -The risks, benefits and alternatives of the procedure were explained to the patient.   -The risks of coronary angiography  include but are not limited to: bleeding, infection, heart rhythm abnormalities, allergic reactions, kidney injury and potential need for dialysis, stroke and death.   - Should stenting be indicated, the patient has agreed to dual anti-platelet therapy for 1-consecutive year with a drug-eluting stent and a minimum of 1-month with the use of a bare metal stent  - Additionally, pt is aware that non-compliance is likely to result in stent clotting with heart attack, heart failure, and/or death  -The risks of moderate sedation include hypotension, respiratory depression, arrhythmias, bronchospasm, and death.   - Informed consent was obtained and the  patient is agreeable to proceed with the procedure.      Primary hypertension  -controlled with amlodipine     Mixed hyperlipidemia  -atorvastatin     Cirrhosis  -pending fu with hepatology     Hepatitis C  -hepatology fu     Cristofer Hernandez MD  Cardiology Fellow    I have personally taken the history and examined this patient and agree with the resident's note as stated above.  1) coronary artery disease.  The patient reports symptoms consistent with CCS 3 angina.  I reviewed the patient's coronary angiogram from the referring hospital.  He has a long high-grade mid LAD stenosis, left circ stenosis and a  of the RCA with left-to-right collaterals.  He was turned down for CABG.  I discussed multivessel PCI with the patient in detail.  I discussed LAD and circ PCI.  If the patient remains symptomatic after that PCI then will reassess the patient for possible RCA  PCI.  I discussed the need for long-term dual antiplatelet therapy with the patient and the associated bleeding risk.  The patient stated that he would like to proceed.   -continue enteric-coated aspirin 81 mg p.o. q.d.  -start Plavix 75 mg p.o. q.day  -start isosorbide mononitrate 30 mg p.o. q.day    2) hypertension.  The patient's blood pressure is adequately controlled in clinic today.  Continue Norvasc 5 mg  p.o. q.day    3) dyslipidemia.  03/17/2023 lipid panel reviewed.  Continue Lipitor 20 mg p.o. q.day unless hepatology deems this should be stopped in light of the patient's liver disease    4) HCV cirrhosis.  The patient has a hepatology appointment on 05/31/2023    5) thrombocytopenia.  The patient's platelet count is a 145 today; this is likely secondary to his liver disease; reassess prior to PCI as he is starting Plavix    All of the patient's questions were answered.

## 2023-04-13 NOTE — PROGRESS NOTES
Patient ID:  Orlin Zamarripa is a 57 y.o. y.o. male who presents for evaluation coronary artery disease    Referring physician: Dr. Mohan    Orlin Zamarripa is a 57 y.o. male with HTN, HLD, CAD and former smoker who presents to IC clinic to discuss multivessel PCI. He has been having exertional chest pain for approximately 1 year, that is worsened lately.  His chest pain is relieved with rest.  He had an angiogram performed at outside hospital, which revealed  to RCA, severe stenosis to mid LAD, and severe stenosis to distal left circumflex artery.  He was evaluated by Dr. Mohan for bypass surgery, however, he was deemed not to be a candidate given cirrhosis/HCV.  He is pending follow-up with hepatology.  He states he continues to have angina with exertion, and this is severely limiting his lifestyle.  Denies palpitations, lower extremity edema, PND, orthopnea, or any other acute events.         Past Medical History:   Diagnosis Date    Hypertension     Mixed hyperlipidemia      Past Surgical History:   Procedure Laterality Date    CARDIAC CATHETERIZATION  03/20/2023    CORONARY ANGIOGRAPHY Right 3/20/2023    Procedure: ANGIOGRAM, CORONARY ARTERY;  Surgeon: Esequiel Zamarripa MD;  Location: [facility] CATH LAB;  Service: Cardiology;  Laterality: Right;  radial     Current Outpatient Medications on File Prior to Visit   Medication Sig Dispense Refill    amLODIPine (NORVASC) 5 MG tablet Take 5 mg by mouth once daily.      aspirin (ECOTRIN) 81 MG EC tablet Take 81 mg by mouth once daily.      atorvastatin (LIPITOR) 20 MG tablet Take 20 mg by mouth once daily.      nitroGLYCERIN (NITROSTAT) 0.4 MG SL tablet Place 1 tablet (0.4 mg total) under the tongue every 5 (five) minutes as needed for Chest pain. (Patient not taking: Reported on 4/13/2023) 30 tablet 3     No current facility-administered medications on file prior to visit.     Review of patient's allergies indicates:  No Known Allergies  Social History     Tobacco Use     "Smoking status: Former     Types: Cigarettes     Quit date: 2020     Years since quittin.9    Smokeless tobacco: Never   Substance Use Topics    Alcohol use: Yes     Alcohol/week: 2.0 standard drinks     Types: 1 Glasses of wine, 1 Cans of beer per week     Comment: soc    Drug use: Never     History reviewed. No pertinent family history.    Review of Systems   Cardiovascular:  Positive for chest pain.   All other systems reviewed and are negative.     Objective:     Vitals:    23 0950 23 0951   BP: 131/82 131/82   BP Location: Left arm Right arm   Patient Position: Sitting Sitting   BP Method: Large (Automatic) Large (Automatic)   Pulse: 68 66   SpO2: 98%    Weight: 79.9 kg (176 lb 2.4 oz)    Height: 5' 7" (1.702 m)          Physical Exam  Vitals and nursing note reviewed.   Constitutional:       Appearance: Normal appearance.   HENT:      Head: Normocephalic and atraumatic.      Right Ear: External ear normal.      Left Ear: External ear normal.      Nose: Nose normal.      Mouth/Throat:      Mouth: Mucous membranes are moist.   Eyes:      Extraocular Movements: Extraocular movements intact.      Pupils: Pupils are equal, round, and reactive to light.   Cardiovascular:      Rate and Rhythm: Normal rate and regular rhythm.      Pulses: Normal pulses.   Pulmonary:      Effort: Pulmonary effort is normal.   Abdominal:      General: Abdomen is flat.   Musculoskeletal:         General: Normal range of motion.      Cervical back: Normal range of motion.      Right lower leg: No edema.      Left lower leg: No edema.   Skin:     General: Skin is warm and dry.      Capillary Refill: Capillary refill takes less than 2 seconds.   Neurological:      General: No focal deficit present.      Mental Status: He is alert and oriented to person, place, and time. Mental status is at baseline.     Labs:     Lab Results   Component Value Date     2023    K 4.4 2023     2023    CO2 26 " 03/17/2023    BUN 19 03/17/2023    CREATININE 1.0 03/17/2023    ANIONGAP 8 03/17/2023     No results found for: HGBA1C  No results found for: BNP, BNPTRIAGEBLO    Lab Results   Component Value Date    WBC 3.68 (L) 03/17/2023    HGB 14.1 03/17/2023    HCT 40.8 03/17/2023     (L) 03/17/2023     Lab Results   Component Value Date    CHOL 104 (L) 03/17/2023    HDL 35 (L) 03/17/2023    LDLCALC 54.0 (L) 03/17/2023    TRIG 75 03/17/2023       Meds:     Current Outpatient Medications:     amLODIPine (NORVASC) 5 MG tablet, Take 5 mg by mouth once daily., Disp: , Rfl:     aspirin (ECOTRIN) 81 MG EC tablet, Take 81 mg by mouth once daily., Disp: , Rfl:     atorvastatin (LIPITOR) 20 MG tablet, Take 20 mg by mouth once daily., Disp: , Rfl:     clopidogreL (PLAVIX) 75 mg tablet, Take 4 tablets (300mg) this evening, followed by 1 tablet (75mg) thereafter., Disp: 30 tablet, Rfl: 11    isosorbide mononitrate (IMDUR) 30 MG 24 hr tablet, Take 1 tablet (30 mg total) by mouth once daily., Disp: 30 tablet, Rfl: 11    nitroGLYCERIN (NITROSTAT) 0.4 MG SL tablet, Place 1 tablet (0.4 mg total) under the tongue every 5 (five) minutes as needed for Chest pain. (Patient not taking: Reported on 4/13/2023), Disp: 30 tablet, Rfl: 3    Current Facility-Administered Medications:     sodium chloride 0.9% flush 10 mL, 10 mL, Intravenous, PRN, Cristofer Hernandez MD      Assessment & Plan:     Coronary artery disease of native artery of native heart with stable angina pectoris  Recurrent angina with exertion and turned down for CABG due to cirrhosis.     --LHC +/- PCI, patient is a SAMRA candidate  - Anti-platelet Therapy: ASA / clopidogrel   - Access: Right radial   - Creatinine/CrCl: 1.0  - Allergies: No shellfish / Iodine allergy  - Pre-Hydration: NS  - Pre-Op Med: Bendaryl 50mg pO   - All patient's questions were answered.  -The risks, benefits and alternatives of the procedure were explained to the patient.   -The risks of coronary angiography  include but are not limited to: bleeding, infection, heart rhythm abnormalities, allergic reactions, kidney injury and potential need for dialysis, stroke and death.   - Should stenting be indicated, the patient has agreed to dual anti-platelet therapy for 1-consecutive year with a drug-eluting stent and a minimum of 1-month with the use of a bare metal stent  - Additionally, pt is aware that non-compliance is likely to result in stent clotting with heart attack, heart failure, and/or death  -The risks of moderate sedation include hypotension, respiratory depression, arrhythmias, bronchospasm, and death.   - Informed consent was obtained and the  patient is agreeable to proceed with the procedure.      Primary hypertension  -controlled with amlodipine     Mixed hyperlipidemia  -atorvastatin     Cirrhosis  -pending fu with hepatology     Hepatitis C  -hepatology fu     Cristofer Hernandez MD  Cardiology Fellow    I have personally taken the history and examined this patient and agree with the resident's note as stated above.  1) coronary artery disease.  The patient reports symptoms consistent with CCS 3 angina.  I reviewed the patient's coronary angiogram from the referring hospital.  He has a long high-grade mid LAD stenosis, left circ stenosis and a  of the RCA with left-to-right collaterals.  He was turned down for CABG.  I discussed multivessel PCI with the patient in detail.  I discussed LAD and circ PCI.  If the patient remains symptomatic after that PCI then will reassess the patient for possible RCA  PCI.  I discussed the need for long-term dual antiplatelet therapy with the patient and the associated bleeding risk.  The patient stated that he would like to proceed.   -continue enteric-coated aspirin 81 mg p.o. q.d.  -start Plavix 75 mg p.o. q.day  -start isosorbide mononitrate 30 mg p.o. q.day    2) hypertension.  The patient's blood pressure is adequately controlled in clinic today.  Continue Norvasc 5 mg  p.o. q.day    3) dyslipidemia.  03/17/2023 lipid panel reviewed.  Continue Lipitor 20 mg p.o. q.day unless hepatology deems this should be stopped in light of the patient's liver disease    4) HCV cirrhosis.  The patient has a hepatology appointment on 05/31/2023    5) thrombocytopenia.  The patient's platelet count is a 145 today; this is likely secondary to his liver disease; reassess prior to PCI as he is starting Plavix    All of the patient's questions were answered.

## 2023-04-13 NOTE — PROGRESS NOTES
OUTPATIENT CATHETERIZATION INSTRUCTIONS    You have been scheduled for a procedure in the catheterization lab on Monday, May 8, 2023.     Please report to the Cardiology Waiting Area on the Third floor of the hospital and check in at 9 AM.   You will then be taken to the SSCU (Short Stay Cardiac Unit) and prepared for your procedure. Please be aware that this is not the time of your procedure but the time you are to arrive. The procedures are scheduled on an hourly basis; however, emergency cases take precedence over all other cases.     No solid foods 8 hours prior to your procedure.  You may have clear liquids until the time of your admission which should be 2 hours prior to your procedure.  You are encouraged to drink at least 8 ounces of clear liquids prior to your admission to SSCU.  Patients with gastric emptying issues should be fasting for 6- 8 hours prior to the procedure.  Clear liquids include water, black coffee, clear juices, and performance drinks - no pulp or milk.    Heart failure or dialysis patients will be limited to 8 ounces (1 cup) of clear liquids up until 2 hours of the procedure.    3.   You may take your regular morning medications with water. If there are any medications that you should not take you will be instructed to hold them that morning. If you         are diabetic and on Metformin (Glucophage) do not take it the day before, the day of, and for 2 days after your procedure.  4.   If you are on Pradaxa, Eliquis or Coumadin , you can hold them 3 days prior to your procedure.  Do not stop your Aspirin, Plavix, Effient, or Brilinta.    The procedure will take 1-2 hours to perform. After the procedure, you will return to SSCU on the third floor of the hospital. You will need to lie still (or keep your arm still) for the next 4 to 6 hours to minimize bleeding from the puncture site. Your family may remain in the room with you during this time.     You may be able to be discharged home that  same afternoon if there is someone to drive you home and there were no complications. If you have one of the balloon, stent, or device procedures you may spend the night in the hospital. Your doctor will determine, based on your progress, the date and time of your discharge. The results of your procedure will be discussed with you before you are discharged. Any further testing or procedures will be scheduled for you either before you leave or you will be called with these appointments.     If you should have any questions, concerns, or need to change the date of your procedure, please call  MARLENA Jamil @ [phone]    Special Instructions:  Start plavix 75mg today - take 8 pills today, then one pill daily, including the morning of procedure.  Start Imdur 30mg daily  Drink plenty of water the day before and after your procedure.           THE ABOVE INSTRUCTIONS WERE GIVEN TO THE PATIENT VERBALLY AND THEY VERBALIZED UNDERSTANDING.  THEY DO NOT REQUIRE ANY SPECIAL NEEDS AND DO NOT HAVE ANY LEARNING BARRIERS.          Directions for Reporting to Cardiology Waiting Area in the Hospital  If you park in the Parking Garage:  Take elevators to the1st floor of the parking garage.  Continue past the gift shop, coffee shop, and piano.  Take a right and go to the gold elevators. (Elevator B)  Take the elevator to the 3rd floor.  Follow the arrow on the sign on the wall that says Cath Lab Registration/EP Lab Registration.  Follow the long hallway all the way around until you come to a big open area.  This is the registration area.  Check in at Reception Desk.    OR    If family is dropping you off:  Have them drop you off at the front of the Hospital under the green overhang.  Enter through the doors and take a right.  Take the E elevators to the 3rd floor Cardiology Waiting Area.  Check in at the Reception Desk in the waiting room.

## 2023-04-13 NOTE — ASSESSMENT & PLAN NOTE
Recurrent angina with exertion and turned down for CABG due to cirrhosis.     --LHC +/- PCI, patient is a SAMRA candidate  - Anti-platelet Therapy: ASA / clopidogrel   - Access: Right radial   - Creatinine/CrCl: 1.0  - Allergies: No shellfish / Iodine allergy  - Pre-Hydration: NS  - Pre-Op Med: Bendaryl 50mg pO   - All patient's questions were answered.  -The risks, benefits and alternatives of the procedure were explained to the patient.   -The risks of coronary angiography include but are not limited to: bleeding, infection, heart rhythm abnormalities, allergic reactions, kidney injury and potential need for dialysis, stroke and death.   - Should stenting be indicated, the patient has agreed to dual anti-platelet therapy for 1-consecutive year with a drug-eluting stent and a minimum of 1-month with the use of a bare metal stent  - Additionally, pt is aware that non-compliance is likely to result in stent clotting with heart attack, heart failure, and/or death  -The risks of moderate sedation include hypotension, respiratory depression, arrhythmias, bronchospasm, and death.   - Informed consent was obtained and the  patient is agreeable to proceed with the procedure.

## 2023-05-08 ENCOUNTER — HOSPITAL ENCOUNTER (OUTPATIENT)
Facility: HOSPITAL | Age: 58
Discharge: HOME | End: 2023-05-08
Attending: INTERNAL MEDICINE | Admitting: INTERNAL MEDICINE
Payer: COMMERCIAL

## 2023-05-08 VITALS
BODY MASS INDEX: 27.31 KG/M2 | HEART RATE: 62 BPM | RESPIRATION RATE: 17 BRPM | DIASTOLIC BLOOD PRESSURE: 68 MMHG | HEIGHT: 67 IN | TEMPERATURE: 99 F | WEIGHT: 174 LBS | SYSTOLIC BLOOD PRESSURE: 116 MMHG | OXYGEN SATURATION: 99 %

## 2023-05-08 DIAGNOSIS — I25.10 CAD (CORONARY ARTERY DISEASE): Primary | ICD-10-CM

## 2023-05-08 DIAGNOSIS — I10 PRIMARY HYPERTENSION: ICD-10-CM

## 2023-05-08 DIAGNOSIS — I25.118 CORONARY ARTERY DISEASE OF NATIVE ARTERY OF NATIVE HEART WITH STABLE ANGINA PECTORIS: ICD-10-CM

## 2023-05-08 LAB
ABO + RH BLD: NORMAL
ANION GAP SERPL CALC-SCNC: 8 MMOL/L (ref 8–16)
BASOPHILS # BLD AUTO: 0.01 K/UL (ref 0–0.2)
BASOPHILS NFR BLD: 0.3 % (ref 0–1.9)
BLD GP AB SCN CELLS X3 SERPL QL: NORMAL
BUN SERPL-MCNC: 18 MG/DL (ref 6–20)
CALCIUM SERPL-MCNC: 8.6 MG/DL (ref 8.7–10.5)
CHLORIDE SERPL-SCNC: 106 MMOL/L (ref 95–110)
CO2 SERPL-SCNC: 24 MMOL/L (ref 23–29)
CREAT SERPL-MCNC: 0.8 MG/DL (ref 0.5–1.4)
DIFFERENTIAL METHOD: ABNORMAL
EOSINOPHIL # BLD AUTO: 0.1 K/UL (ref 0–0.5)
EOSINOPHIL NFR BLD: 2.4 % (ref 0–8)
ERYTHROCYTE [DISTWIDTH] IN BLOOD BY AUTOMATED COUNT: 12.2 % (ref 11.5–14.5)
EST. GFR  (NO RACE VARIABLE): >60 ML/MIN/1.73 M^2
GLUCOSE SERPL-MCNC: 91 MG/DL (ref 70–110)
HCT VFR BLD AUTO: 37.8 % (ref 40–54)
HGB BLD-MCNC: 12.9 G/DL (ref 14–18)
IMM GRANULOCYTES # BLD AUTO: 0.01 K/UL (ref 0–0.04)
IMM GRANULOCYTES NFR BLD AUTO: 0.3 % (ref 0–0.5)
LYMPHOCYTES # BLD AUTO: 1.2 K/UL (ref 1–4.8)
LYMPHOCYTES NFR BLD: 42.3 % (ref 18–48)
MCH RBC QN AUTO: 32.2 PG (ref 27–31)
MCHC RBC AUTO-ENTMCNC: 34.1 G/DL (ref 32–36)
MCV RBC AUTO: 94 FL (ref 82–98)
MONOCYTES # BLD AUTO: 0.3 K/UL (ref 0.3–1)
MONOCYTES NFR BLD: 8.6 % (ref 4–15)
NEUTROPHILS # BLD AUTO: 1.3 K/UL (ref 1.8–7.7)
NEUTROPHILS NFR BLD: 46.1 % (ref 38–73)
NRBC BLD-RTO: 0 /100 WBC
PLATELET # BLD AUTO: 126 K/UL (ref 150–450)
PMV BLD AUTO: 9.8 FL (ref 9.2–12.9)
POTASSIUM SERPL-SCNC: 4 MMOL/L (ref 3.5–5.1)
RBC # BLD AUTO: 4.01 M/UL (ref 4.6–6.2)
SODIUM SERPL-SCNC: 138 MMOL/L (ref 136–145)
SPECIMEN OUTDATE: NORMAL
WBC # BLD AUTO: 2.91 K/UL (ref 3.9–12.7)

## 2023-05-08 PROCEDURE — 99152 PR MOD CONSCIOUS SEDATION, SAME PHYS, 5+ YRS, FIRST 15 MIN: ICD-10-PCS | Mod: ,,, | Performed by: INTERNAL MEDICINE

## 2023-05-08 PROCEDURE — 86900 BLOOD TYPING SEROLOGIC ABO: CPT | Performed by: INTERNAL MEDICINE

## 2023-05-08 PROCEDURE — 93571 IV DOP VEL&/PRESS C FLO 1ST: CPT | Mod: 26,52,LC, | Performed by: INTERNAL MEDICINE

## 2023-05-08 PROCEDURE — 92978 ENDOLUMINL IVUS OCT C 1ST: CPT | Mod: LD | Performed by: INTERNAL MEDICINE

## 2023-05-08 PROCEDURE — 93005 ELECTROCARDIOGRAM TRACING: CPT | Mod: 59

## 2023-05-08 PROCEDURE — 92928 PR STENT: ICD-10-PCS | Mod: 59,51,LC, | Performed by: INTERNAL MEDICINE

## 2023-05-08 PROCEDURE — 93458 PR CATH PLACE/CORON ANGIO, IMG SUPER/INTERP,W LEFT HEART VENTRICULOGRAPHY: ICD-10-PCS | Mod: 26,59,51, | Performed by: INTERNAL MEDICINE

## 2023-05-08 PROCEDURE — 92978 ENDOLUMINL IVUS OCT C 1ST: CPT | Mod: 26,LD,, | Performed by: INTERNAL MEDICINE

## 2023-05-08 PROCEDURE — C1753 CATH, INTRAVAS ULTRASOUND: HCPCS | Performed by: INTERNAL MEDICINE

## 2023-05-08 PROCEDURE — 93010 EKG 12-LEAD: ICD-10-PCS | Mod: ,,, | Performed by: INTERNAL MEDICINE

## 2023-05-08 PROCEDURE — 25000003 PHARM REV CODE 250: Performed by: STUDENT IN AN ORGANIZED HEALTH CARE EDUCATION/TRAINING PROGRAM

## 2023-05-08 PROCEDURE — 99152 MOD SED SAME PHYS/QHP 5/>YRS: CPT | Mod: ,,, | Performed by: INTERNAL MEDICINE

## 2023-05-08 PROCEDURE — 93005 ELECTROCARDIOGRAM TRACING: CPT

## 2023-05-08 PROCEDURE — C9600 PERC DRUG-EL COR STENT SING: HCPCS | Mod: LD | Performed by: INTERNAL MEDICINE

## 2023-05-08 PROCEDURE — 99153 MOD SED SAME PHYS/QHP EA: CPT | Performed by: INTERNAL MEDICINE

## 2023-05-08 PROCEDURE — 93571 PR HEART FLOW RESERV MEASURE,INIT VESSL: ICD-10-PCS | Mod: 26,52,LC, | Performed by: INTERNAL MEDICINE

## 2023-05-08 PROCEDURE — 25000003 PHARM REV CODE 250: Performed by: INTERNAL MEDICINE

## 2023-05-08 PROCEDURE — 27201423 OPTIME MED/SURG SUP & DEVICES STERILE SUPPLY: Performed by: INTERNAL MEDICINE

## 2023-05-08 PROCEDURE — 93010 ELECTROCARDIOGRAM REPORT: CPT | Mod: ,,, | Performed by: INTERNAL MEDICINE

## 2023-05-08 PROCEDURE — C1769 GUIDE WIRE: HCPCS | Performed by: INTERNAL MEDICINE

## 2023-05-08 PROCEDURE — C1894 INTRO/SHEATH, NON-LASER: HCPCS | Performed by: INTERNAL MEDICINE

## 2023-05-08 PROCEDURE — 92978 PR IVUS, CORONARY, 1ST VESSEL: ICD-10-PCS | Mod: 26,LD,, | Performed by: INTERNAL MEDICINE

## 2023-05-08 PROCEDURE — 93458 L HRT ARTERY/VENTRICLE ANGIO: CPT | Mod: 26,59,51, | Performed by: INTERNAL MEDICINE

## 2023-05-08 PROCEDURE — 63600175 PHARM REV CODE 636 W HCPCS: Performed by: INTERNAL MEDICINE

## 2023-05-08 PROCEDURE — 85025 COMPLETE CBC W/AUTO DIFF WBC: CPT | Performed by: STUDENT IN AN ORGANIZED HEALTH CARE EDUCATION/TRAINING PROGRAM

## 2023-05-08 PROCEDURE — 85347 COAGULATION TIME ACTIVATED: CPT | Performed by: INTERNAL MEDICINE

## 2023-05-08 PROCEDURE — 80048 BASIC METABOLIC PNL TOTAL CA: CPT | Performed by: STUDENT IN AN ORGANIZED HEALTH CARE EDUCATION/TRAINING PROGRAM

## 2023-05-08 PROCEDURE — 92928 PRQ TCAT PLMT NTRAC ST 1 LES: CPT | Mod: 59,51,LC, | Performed by: INTERNAL MEDICINE

## 2023-05-08 PROCEDURE — C1725 CATH, TRANSLUMIN NON-LASER: HCPCS | Performed by: INTERNAL MEDICINE

## 2023-05-08 PROCEDURE — 93458 L HRT ARTERY/VENTRICLE ANGIO: CPT | Mod: 59 | Performed by: INTERNAL MEDICINE

## 2023-05-08 PROCEDURE — 93799 UNLISTED CV SVC/PROCEDURE: CPT | Mod: LC | Performed by: INTERNAL MEDICINE

## 2023-05-08 PROCEDURE — 99152 MOD SED SAME PHYS/QHP 5/>YRS: CPT | Performed by: INTERNAL MEDICINE

## 2023-05-08 PROCEDURE — C1874 STENT, COATED/COV W/DEL SYS: HCPCS | Performed by: INTERNAL MEDICINE

## 2023-05-08 PROCEDURE — 25500020 PHARM REV CODE 255: Performed by: INTERNAL MEDICINE

## 2023-05-08 PROCEDURE — C1887 CATHETER, GUIDING: HCPCS | Performed by: INTERNAL MEDICINE

## 2023-05-08 DEVICE — EVEROLIMUS-ELUTING PLATINUM CHROMIUM CORONARY STENT SYSTEM
Type: IMPLANTABLE DEVICE | Site: CORONARY | Status: FUNCTIONAL
Brand: SYNERGY™ XD

## 2023-05-08 RX ORDER — SODIUM CHLORIDE 9 MG/ML
INJECTION, SOLUTION INTRAVENOUS
Status: DISCONTINUED | OUTPATIENT
Start: 2023-05-08 | End: 2023-05-08 | Stop reason: HOSPADM

## 2023-05-08 RX ORDER — HEPARIN SOD,PORCINE/0.9 % NACL 1000/500ML
INTRAVENOUS SOLUTION INTRAVENOUS
Status: DISCONTINUED | OUTPATIENT
Start: 2023-05-08 | End: 2023-05-08 | Stop reason: HOSPADM

## 2023-05-08 RX ORDER — MIDAZOLAM HYDROCHLORIDE 1 MG/ML
INJECTION, SOLUTION INTRAMUSCULAR; INTRAVENOUS
Status: DISCONTINUED | OUTPATIENT
Start: 2023-05-08 | End: 2023-05-08 | Stop reason: HOSPADM

## 2023-05-08 RX ORDER — DIPHENHYDRAMINE HCL 50 MG
50 CAPSULE ORAL ONCE
Status: COMPLETED | OUTPATIENT
Start: 2023-05-08 | End: 2023-05-08

## 2023-05-08 RX ORDER — LIDOCAINE HYDROCHLORIDE 20 MG/ML
INJECTION, SOLUTION INFILTRATION; PERINEURAL
Status: DISCONTINUED | OUTPATIENT
Start: 2023-05-08 | End: 2023-05-08 | Stop reason: HOSPADM

## 2023-05-08 RX ORDER — SODIUM CHLORIDE 9 MG/ML
INJECTION, SOLUTION INTRAVENOUS CONTINUOUS
Status: DISCONTINUED | OUTPATIENT
Start: 2023-05-08 | End: 2023-05-08 | Stop reason: HOSPADM

## 2023-05-08 RX ORDER — FENTANYL CITRATE 50 UG/ML
INJECTION, SOLUTION INTRAMUSCULAR; INTRAVENOUS
Status: DISCONTINUED | OUTPATIENT
Start: 2023-05-08 | End: 2023-05-08 | Stop reason: HOSPADM

## 2023-05-08 RX ORDER — SODIUM CHLORIDE 9 MG/ML
INJECTION, SOLUTION INTRAVENOUS CONTINUOUS
Status: ACTIVE | OUTPATIENT
Start: 2023-05-08 | End: 2023-05-08

## 2023-05-08 RX ORDER — HEPARIN SODIUM 1000 [USP'U]/ML
INJECTION, SOLUTION INTRAVENOUS; SUBCUTANEOUS
Status: DISCONTINUED | OUTPATIENT
Start: 2023-05-08 | End: 2023-05-08 | Stop reason: HOSPADM

## 2023-05-08 RX ADMIN — SODIUM CHLORIDE: 9 INJECTION, SOLUTION INTRAVENOUS at 09:05

## 2023-05-08 RX ADMIN — DIPHENHYDRAMINE HYDROCHLORIDE 50 MG: 50 CAPSULE ORAL at 09:05

## 2023-05-08 NOTE — PLAN OF CARE
Received report from [facility] cath lab, MARLENA. Patient s/p [facility], AAOx4. VSS, no c/o pain or discomfort at this time, resp even and unlabored. Right wrist vasc band dressing is CDI. Pulses 2+.  No active bleeding. No hematoma noted. Post procedure protocol reviewed with patient. Understanding verbalized. Nurse call bell within reach. Will continue to monitor per post procedure protocol.

## 2023-05-08 NOTE — NURSING
Report from MARLENA Garcia.  Awake alert and oriented.  Pt spouse at his side.  Denies pain or SOB.  Vasc band in place to right wrist without bleeding or hematoma noted.  Food and drinks provided.  Oriented to room and call bell provided.  Will continue to monitor.

## 2023-05-08 NOTE — PLAN OF CARE
Pt ambulated on unit this morning with wife at his side.  Denies pain or SOB.  Verbalized an understanding of procedure.  Oriented to unit and call bell provided.  Will continue to monitor.

## 2023-05-08 NOTE — BRIEF OP NOTE
Brief Operative Note:    : Jose Hinton MD     Referring Physician: Dennis Mohan     All Operators: Surgeon(s):  MD Edward Hussein MD     Preoperative Diagnosis: Coronary artery disease of native artery of native heart with stable angina pectoris [I25.118]     Postop Diagnosis: s/p PCI    Treatments/Procedures: Procedure(s) (LRB):  Stent, Drug Eluting, Multi Vessel, Coronary (Left)  IVUS, Coronary  Left heart cath (Left)  ANGIOGRAM, CORONARY ARTERY (N/A)  Instantaneous Wave-Free Ratio (IFR) (N/A)    Access: Right radial artery    Findings:Severe multivessel coronary artery disease is present.     See catheterization report for full details.    Intervention: DESx2 mLAD, DESx1 OM    See catheterization report for full details.    Closure device: VascBand       Plan:  - Post cath protocol   - IVF @ 200 cc/hr x 4 hours  - Continue aspirin 81 mg daily indefinitely  - Continue DAPT for minimum 6 months, ideally up to 1 year  - Continue high intensity statin therapy (LDL goal < 70)  - Risk factor reduction (BP <130/80 mmHg, glycemic control, etc)  - Cardiac rehab referral  - Follow up with outpatient cardiologist    Estimated Blood loss: 20 cc    Specimens removed: No    Edward Godfrey MD  Interventional Cardiology PGY-7  05/08/2023

## 2023-05-08 NOTE — NURSING
Vasc band removed as per protocol.  No bleeding or hematoma noted.  VSS.  No c/o pain or SOB.  VSS.  Gauze/tegaderm applied to right radial puncture site.

## 2023-05-08 NOTE — DISCHARGE SUMMARY
Discharge Summary  Interventional Cardiology      Admit Date: 5/8/2023    Discharge Date:  5/8/2023    Attending Physician: Jose Hinton MD    Discharge Physician: Jose Hinton MD    Principal Diagnoses: Coronary artery disease of native artery of native heart with stable angina pectoris  Indication for Admission: Stent, Drug Eluting, Multi Vessel, Coronary (Left), IVUS, Coronary, Left heart cath (Left), ANGIOGRAM, CORONARY ARTERY (N/A), Instantaneous Wave-Free Ratio (IFR) (N/A)    Discharged Condition: Good    Hospital Course:   Patient presented for outpatient coronary angiogram which went without complication. Coronary angiogram revealed severe LAD and OM disease. DESx2 LAD, DESx1 OM . See full cath report in Epic for details. Hemostasis of patient's R Radial access site was achieved with VascBand. Patient was monitored per post-cath protocol, and his R radial access site was c/d/i with no hematoma. Patient was able to ambulate without difficulty. He was feeling well and anticipating discharge home today.     Outpatient Plan:  - There were no medication changes    Diet: Cardiac diet    Activity: Ad kahlil, wound care instructions provided    Disposition: Home or Self Care    Follow Up: as scheduled      Discharge Medications:      Medication List        CONTINUE taking these medications      amLODIPine 5 MG tablet  Commonly known as: NORVASC     aspirin 81 MG EC tablet  Commonly known as: ECOTRIN     atorvastatin 20 MG tablet  Commonly known as: LIPITOR     clopidogreL 75 mg tablet  Commonly known as: PLAVIX  Take 4 tablets (300mg) this evening, followed by 1 tablet (75mg) thereafter.     isosorbide mononitrate 30 MG 24 hr tablet  Commonly known as: IMDUR  Take 1 tablet (30 mg total) by mouth once daily.     nitroGLYCERIN 0.4 MG SL tablet  Commonly known as: NITROSTAT  Place 1 tablet (0.4 mg total) under the tongue every 5 (five) minutes as needed for Chest pain.              Edward  Epifanio  Interventional Cardiology Fellow PGY-7  05/08/2023

## 2023-05-08 NOTE — NURSING
IV's d/c'd x 2 with cath tips intact.  Pt and pt spouse verbalized an understanding of d/c instructions.  Right wrist gauze/tegaderm remains clean dry and intact.  Denies pain or SOB.  Pt and pt spouse both verbalized an understanding of d/c instructions.  Off unit via wheelchair and transporter for d/c home. Voiding and walking without difficulty.

## 2023-05-08 NOTE — Clinical Note
100 ml of contrast were injected throughout the case. 50 mL of contrast was the total wasted during the case. 150 mL was the total amount used during the case.

## 2023-05-09 ENCOUNTER — TELEPHONE (OUTPATIENT)
Dept: CARDIAC REHAB | Facility: CLINIC | Age: 58
End: 2023-05-09
Payer: COMMERCIAL

## 2023-05-09 LAB
POC ACTIVATED CLOTTING TIME K: 179 SEC (ref 74–137)
POC ACTIVATED CLOTTING TIME K: 931 SEC (ref 74–137)
SAMPLE: ABNORMAL
SAMPLE: ABNORMAL

## 2023-05-09 NOTE — TELEPHONE ENCOUNTER
Letter regarding Phase II cardiac rehab was sent to patient, along with telephone # for Ashish Loera.  Sabine Barrow RN  Cardiac Rehab Nurse

## 2023-05-31 ENCOUNTER — OFFICE VISIT (OUTPATIENT)
Dept: HEPATOLOGY | Facility: CLINIC | Age: 58
End: 2023-05-31
Payer: COMMERCIAL

## 2023-05-31 ENCOUNTER — LAB VISIT (OUTPATIENT)
Dept: LAB | Facility: HOSPITAL | Age: 58
End: 2023-05-31
Attending: INTERNAL MEDICINE
Payer: COMMERCIAL

## 2023-05-31 VITALS
TEMPERATURE: 99 F | BODY MASS INDEX: 27.51 KG/M2 | WEIGHT: 175.25 LBS | HEART RATE: 62 BPM | SYSTOLIC BLOOD PRESSURE: 109 MMHG | OXYGEN SATURATION: 99 % | HEIGHT: 67 IN | RESPIRATION RATE: 18 BRPM | DIASTOLIC BLOOD PRESSURE: 67 MMHG

## 2023-05-31 DIAGNOSIS — B19.20 HEPATITIS C VIRUS INFECTION WITHOUT HEPATIC COMA, UNSPECIFIED CHRONICITY: ICD-10-CM

## 2023-05-31 DIAGNOSIS — K74.69 OTHER CIRRHOSIS OF LIVER: Primary | ICD-10-CM

## 2023-05-31 DIAGNOSIS — Z87.19 HISTORY OF CIRRHOSIS OF LIVER: ICD-10-CM

## 2023-05-31 DIAGNOSIS — D69.6 THROMBOCYTOPENIA: ICD-10-CM

## 2023-05-31 LAB
A1AT SERPL-MCNC: 127 MG/DL (ref 100–190)
AFP SERPL-MCNC: 3.2 NG/ML (ref 0–8.4)
ALBUMIN SERPL BCP-MCNC: 4.5 G/DL (ref 3.5–5.2)
ALP SERPL-CCNC: 59 U/L (ref 55–135)
ALT SERPL W/O P-5'-P-CCNC: 47 U/L (ref 10–44)
ANION GAP SERPL CALC-SCNC: 9 MMOL/L (ref 8–16)
AST SERPL-CCNC: 33 U/L (ref 10–40)
BASOPHILS # BLD AUTO: 0 K/UL (ref 0–0.2)
BASOPHILS NFR BLD: 0 % (ref 0–1.9)
BILIRUB DIRECT SERPL-MCNC: 0.4 MG/DL (ref 0.1–0.3)
BILIRUB SERPL-MCNC: 0.8 MG/DL (ref 0.1–1)
BUN SERPL-MCNC: 22 MG/DL (ref 6–20)
CALCIUM SERPL-MCNC: 9.7 MG/DL (ref 8.7–10.5)
CHLORIDE SERPL-SCNC: 106 MMOL/L (ref 95–110)
CO2 SERPL-SCNC: 26 MMOL/L (ref 23–29)
CREAT SERPL-MCNC: 0.9 MG/DL (ref 0.5–1.4)
DIFFERENTIAL METHOD: ABNORMAL
EOSINOPHIL # BLD AUTO: 0.1 K/UL (ref 0–0.5)
EOSINOPHIL NFR BLD: 2.2 % (ref 0–8)
ERYTHROCYTE [DISTWIDTH] IN BLOOD BY AUTOMATED COUNT: 12.4 % (ref 11.5–14.5)
EST. GFR  (NO RACE VARIABLE): >60 ML/MIN/1.73 M^2
FERRITIN SERPL-MCNC: 708 NG/ML (ref 20–300)
GLUCOSE SERPL-MCNC: 78 MG/DL (ref 70–110)
HAV IGG SER QL IA: REACTIVE
HBV CORE AB SERPL QL IA: REACTIVE
HBV SURFACE AB SER-ACNC: 89.23 MIU/ML
HBV SURFACE AB SER-ACNC: REACTIVE M[IU]/ML
HBV SURFACE AG SERPL QL IA: NORMAL
HCT VFR BLD AUTO: 40.3 % (ref 40–54)
HCV AB SERPL QL IA: REACTIVE
HGB BLD-MCNC: 14.1 G/DL (ref 14–18)
HIV 1+2 AB+HIV1 P24 AG SERPL QL IA: NORMAL
IGG SERPL-MCNC: 1721 MG/DL (ref 650–1600)
IMM GRANULOCYTES # BLD AUTO: 0.01 K/UL (ref 0–0.04)
IMM GRANULOCYTES NFR BLD AUTO: 0.3 % (ref 0–0.5)
INR PPP: 1 (ref 0.8–1.2)
IRON SERPL-MCNC: 152 UG/DL (ref 45–160)
LYMPHOCYTES # BLD AUTO: 1 K/UL (ref 1–4.8)
LYMPHOCYTES NFR BLD: 30.3 % (ref 18–48)
MCH RBC QN AUTO: 32.5 PG (ref 27–31)
MCHC RBC AUTO-ENTMCNC: 35 G/DL (ref 32–36)
MCV RBC AUTO: 93 FL (ref 82–98)
MONOCYTES # BLD AUTO: 0.3 K/UL (ref 0.3–1)
MONOCYTES NFR BLD: 9.7 % (ref 4–15)
NEUTROPHILS # BLD AUTO: 1.8 K/UL (ref 1.8–7.7)
NEUTROPHILS NFR BLD: 57.5 % (ref 38–73)
NRBC BLD-RTO: 0 /100 WBC
PLATELET # BLD AUTO: 160 K/UL (ref 150–450)
PMV BLD AUTO: 9.6 FL (ref 9.2–12.9)
POTASSIUM SERPL-SCNC: 4 MMOL/L (ref 3.5–5.1)
PROT SERPL-MCNC: 8.2 G/DL (ref 6–8.4)
PROTHROMBIN TIME: 11.2 SEC (ref 9–12.5)
RBC # BLD AUTO: 4.34 M/UL (ref 4.6–6.2)
SATURATED IRON: 42 % (ref 20–50)
SODIUM SERPL-SCNC: 141 MMOL/L (ref 136–145)
TOTAL IRON BINDING CAPACITY: 360 UG/DL (ref 250–450)
TRANSFERRIN SERPL-MCNC: 243 MG/DL (ref 200–375)
WBC # BLD AUTO: 3.2 K/UL (ref 3.9–12.7)

## 2023-05-31 PROCEDURE — 86707 HEPATITIS BE ANTIBODY: CPT | Performed by: INTERNAL MEDICINE

## 2023-05-31 PROCEDURE — 87350 HEPATITIS BE AG IA: CPT | Performed by: INTERNAL MEDICINE

## 2023-05-31 PROCEDURE — 99204 OFFICE O/P NEW MOD 45 MIN: CPT | Mod: S$GLB,,, | Performed by: INTERNAL MEDICINE

## 2023-05-31 PROCEDURE — 82105 ALPHA-FETOPROTEIN SERUM: CPT | Performed by: INTERNAL MEDICINE

## 2023-05-31 PROCEDURE — 99999 PR PBB SHADOW E&M-EST. PATIENT-LVL V: CPT | Mod: PBBFAC,,, | Performed by: INTERNAL MEDICINE

## 2023-05-31 PROCEDURE — 99204 PR OFFICE/OUTPT VISIT, NEW, LEVL IV, 45-59 MIN: ICD-10-PCS | Mod: S$GLB,,, | Performed by: INTERNAL MEDICINE

## 2023-05-31 PROCEDURE — 82728 ASSAY OF FERRITIN: CPT | Performed by: INTERNAL MEDICINE

## 2023-05-31 PROCEDURE — 86038 ANTINUCLEAR ANTIBODIES: CPT | Performed by: INTERNAL MEDICINE

## 2023-05-31 PROCEDURE — 3078F PR MOST RECENT DIASTOLIC BLOOD PRESSURE < 80 MM HG: ICD-10-PCS | Mod: CPTII,S$GLB,, | Performed by: INTERNAL MEDICINE

## 2023-05-31 PROCEDURE — 82248 BILIRUBIN DIRECT: CPT | Performed by: INTERNAL MEDICINE

## 2023-05-31 PROCEDURE — 82103 ALPHA-1-ANTITRYPSIN TOTAL: CPT | Performed by: INTERNAL MEDICINE

## 2023-05-31 PROCEDURE — 86790 VIRUS ANTIBODY NOS: CPT | Performed by: INTERNAL MEDICINE

## 2023-05-31 PROCEDURE — 87517 HEPATITIS B DNA QUANT: CPT | Performed by: INTERNAL MEDICINE

## 2023-05-31 PROCEDURE — 3008F BODY MASS INDEX DOCD: CPT | Mod: CPTII,S$GLB,, | Performed by: INTERNAL MEDICINE

## 2023-05-31 PROCEDURE — 87522 HEPATITIS C REVRS TRNSCRPJ: CPT | Performed by: INTERNAL MEDICINE

## 2023-05-31 PROCEDURE — 3074F SYST BP LT 130 MM HG: CPT | Mod: CPTII,S$GLB,, | Performed by: INTERNAL MEDICINE

## 2023-05-31 PROCEDURE — 86706 HEP B SURFACE ANTIBODY: CPT | Performed by: INTERNAL MEDICINE

## 2023-05-31 PROCEDURE — 36415 COLL VENOUS BLD VENIPUNCTURE: CPT | Mod: PN | Performed by: INTERNAL MEDICINE

## 2023-05-31 PROCEDURE — 86381 MITOCHONDRIAL ANTIBODY EACH: CPT | Performed by: INTERNAL MEDICINE

## 2023-05-31 PROCEDURE — 82103 ALPHA-1-ANTITRYPSIN TOTAL: CPT | Mod: 91 | Performed by: INTERNAL MEDICINE

## 2023-05-31 PROCEDURE — 82784 ASSAY IGA/IGD/IGG/IGM EACH: CPT | Performed by: INTERNAL MEDICINE

## 2023-05-31 PROCEDURE — 82104 ALPHA-1-ANTITRYPSIN PHENO: CPT | Performed by: INTERNAL MEDICINE

## 2023-05-31 PROCEDURE — 3074F PR MOST RECENT SYSTOLIC BLOOD PRESSURE < 130 MM HG: ICD-10-PCS | Mod: CPTII,S$GLB,, | Performed by: INTERNAL MEDICINE

## 2023-05-31 PROCEDURE — 80321 ALCOHOLS BIOMARKERS 1OR 2: CPT | Performed by: INTERNAL MEDICINE

## 2023-05-31 PROCEDURE — 86704 HEP B CORE ANTIBODY TOTAL: CPT | Performed by: INTERNAL MEDICINE

## 2023-05-31 PROCEDURE — 80053 COMPREHEN METABOLIC PANEL: CPT | Performed by: INTERNAL MEDICINE

## 2023-05-31 PROCEDURE — 85025 COMPLETE CBC W/AUTO DIFF WBC: CPT | Performed by: INTERNAL MEDICINE

## 2023-05-31 PROCEDURE — 99999 PR PBB SHADOW E&M-EST. PATIENT-LVL V: ICD-10-PCS | Mod: PBBFAC,,, | Performed by: INTERNAL MEDICINE

## 2023-05-31 PROCEDURE — 3078F DIAST BP <80 MM HG: CPT | Mod: CPTII,S$GLB,, | Performed by: INTERNAL MEDICINE

## 2023-05-31 PROCEDURE — 84466 ASSAY OF TRANSFERRIN: CPT | Performed by: INTERNAL MEDICINE

## 2023-05-31 PROCEDURE — 86015 ACTIN ANTIBODY EACH: CPT | Performed by: INTERNAL MEDICINE

## 2023-05-31 PROCEDURE — 87389 HIV-1 AG W/HIV-1&-2 AB AG IA: CPT | Performed by: INTERNAL MEDICINE

## 2023-05-31 PROCEDURE — 86803 HEPATITIS C AB TEST: CPT | Performed by: INTERNAL MEDICINE

## 2023-05-31 PROCEDURE — 3008F PR BODY MASS INDEX (BMI) DOCUMENTED: ICD-10-PCS | Mod: CPTII,S$GLB,, | Performed by: INTERNAL MEDICINE

## 2023-05-31 PROCEDURE — 87340 HEPATITIS B SURFACE AG IA: CPT | Performed by: INTERNAL MEDICINE

## 2023-05-31 PROCEDURE — 85610 PROTHROMBIN TIME: CPT | Performed by: INTERNAL MEDICINE

## 2023-05-31 RX ORDER — METOPROLOL TARTRATE 25 MG/1
25 TABLET, FILM COATED ORAL
COMMUNITY
Start: 2023-04-13

## 2023-05-31 RX ORDER — ERGOCALCIFEROL 1.25 MG/1
50000 CAPSULE ORAL
COMMUNITY
Start: 2023-05-08

## 2023-05-31 NOTE — PROGRESS NOTES
HEPATOLOGY CONSULTATION    Referring Physician: Romy Vences  Current Corresponding Physician: Romy Vences, Berna Hess, ENEDELIA-LENNOX     Reason for Consultation: Consultation for evaluation of Cirrhosis and Hepatitis C    History of Present Illness: Orlin Zamarripa is a 57 y.o. male CAD on anticoagulation who presents for evaluation of cirrhosis secondary to hepatitis C.    --In 2013: tx with peg/riba and telap for 10 months only- achieved SVR  --concern for possible HCC since nodule seen on abdo US in 2013-underwent CT scan:  TP CT 3/20/2013: no liver lesion seen; + portal htn  --since then patient denies any symptoms of decompensated cirrhosis, including no ascites or edema, cognitive problems that would suggest hepatic encephalopathy, or GI bleeding from varices.     CT chest abdo pelvis wo contrast 3/27/23: There is nodular contour liver, suggestive of cirrhosis.  No discrete hepatic lesion is identified.    Labs 3/17/23: ALT 39, AST 31, ALKP 61, Tbil 0.7, ptls 144  HCV RNA 3/12/2013: negative     MELD-Na: 6 at 5/31/2023  9:20 AM  MELD: 6 at 5/31/2023  9:20 AM  Calculated from:  Serum Creatinine: 0.9 mg/dL (Using min of 1 mg/dL) at 5/31/2023  9:20 AM  Serum Sodium: 141 mmol/L (Using max of 137 mmol/L) at 5/31/2023  9:20 AM  Total Bilirubin: 0.8 mg/dL (Using min of 1 mg/dL) at 5/31/2023  9:20 AM  INR(ratio): 1.0 at 5/31/2023  9:20 AM       MELD-Na: 6 at 3/17/2023  8:50 AM  MELD: 6 at 3/17/2023  8:50 AM  Calculated from:  Serum Creatinine: 1.0 mg/dL at 3/17/2023  8:50 AM  Serum Sodium: 140 mmol/L (Using max of 137 mmol/L) at 3/17/2023  8:50 AM  Total Bilirubin: 0.7 mg/dL (Using min of 1 mg/dL) at 3/17/2023  8:50 AM  INR(ratio): 1.0 at 3/17/2023  8:50 AM         Chief Complaint   Patient presents with    Cirrhosis    Hepatitis C       Past Medical History:   Diagnosis Date    Hypertension     Mixed hyperlipidemia      Outpatient Encounter Medications as of 5/31/2023   Medication Sig Dispense Refill     amLODIPine (NORVASC) 5 MG tablet Take 5 mg by mouth once daily.      aspirin (ECOTRIN) 81 MG EC tablet Take 81 mg by mouth once daily.      atorvastatin (LIPITOR) 20 MG tablet Take 20 mg by mouth once daily.      clopidogreL (PLAVIX) 75 mg tablet Take 4 tablets (300mg) this evening, followed by 1 tablet (75mg) thereafter. 30 tablet 11    ergocalciferol (ERGOCALCIFEROL) 50,000 unit Cap Take 50,000 Units by mouth every 7 days.      isosorbide mononitrate (IMDUR) 30 MG 24 hr tablet Take 1 tablet (30 mg total) by mouth once daily. 30 tablet 11    metoprolol tartrate (LOPRESSOR) 25 MG tablet Take 25 mg by mouth.      nitroGLYCERIN (NITROSTAT) 0.4 MG SL tablet Place 1 tablet (0.4 mg total) under the tongue every 5 (five) minutes as needed for Chest pain. (Patient not taking: Reported on 5/31/2023) 30 tablet 3     Facility-Administered Encounter Medications as of 5/31/2023   Medication Dose Route Frequency Provider Last Rate Last Admin    sodium chloride 0.9% flush 10 mL  10 mL Intravenous PRN Cristofer Hernandez MD         Review of patient's allergies indicates:  No Known Allergies  History reviewed. No pertinent family history.    Social History     Socioeconomic History    Marital status:    Tobacco Use    Smoking status: Former     Types: Cigarettes     Quit date: 4/20/2020     Years since quitting: 3.1    Smokeless tobacco: Never   Substance and Sexual Activity    Alcohol use: Yes     Alcohol/week: 2.0 standard drinks     Types: 1 Glasses of wine, 1 Cans of beer per week     Comment: soc    Drug use: Never     Review of Systems   Constitutional: Negative.    HENT: Negative.     Eyes: Negative.    Respiratory: Negative.     Cardiovascular: Negative.    Gastrointestinal: Negative.    Genitourinary: Negative.    Musculoskeletal: Negative.    Skin: Negative.    Neurological: Negative.    Psychiatric/Behavioral: Negative.     Vitals:    05/31/23 0820   BP: 109/67   Pulse: 62   Resp: 18   Temp: 98.7 °F (37.1 °C)        Physical Exam  Vitals reviewed.   Constitutional:       Appearance: He is well-developed.   HENT:      Head: Normocephalic and atraumatic.   Eyes:      General: No scleral icterus.     Conjunctiva/sclera: Conjunctivae normal.      Pupils: Pupils are equal, round, and reactive to light.   Neck:      Thyroid: No thyromegaly.   Cardiovascular:      Rate and Rhythm: Normal rate and regular rhythm.      Heart sounds: Normal heart sounds.   Pulmonary:      Effort: Pulmonary effort is normal.      Breath sounds: Normal breath sounds. No rales.   Abdominal:      General: Bowel sounds are normal. There is no distension.      Palpations: Abdomen is soft. There is no mass.      Tenderness: There is no abdominal tenderness.   Musculoskeletal:         General: Normal range of motion.      Cervical back: Normal range of motion and neck supple.   Skin:     General: Skin is warm and dry.      Findings: No rash.   Neurological:      Mental Status: He is alert and oriented to person, place, and time.       MELD-Na: 6 at 3/17/2023  8:50 AM  MELD: 6 at 3/17/2023  8:50 AM  Calculated from:  Serum Creatinine: 1.0 mg/dL at 3/17/2023  8:50 AM  Serum Sodium: 140 mmol/L (Using max of 137 mmol/L) at 3/17/2023  8:50 AM  Total Bilirubin: 0.7 mg/dL (Using min of 1 mg/dL) at 3/17/2023  8:50 AM  INR(ratio): 1.0 at 3/17/2023  8:50 AM      Lab Results   Component Value Date    GLU 91 05/08/2023    BUN 18 05/08/2023    CREATININE 0.8 05/08/2023    CALCIUM 8.6 (L) 05/08/2023     05/08/2023    K 4.0 05/08/2023     05/08/2023    PROT 7.8 03/17/2023    CO2 24 05/08/2023    ANIONGAP 8 05/08/2023    WBC 2.91 (L) 05/08/2023    RBC 4.01 (L) 05/08/2023    HGB 12.9 (L) 05/08/2023    HCT 37.8 (L) 05/08/2023    MCV 94 05/08/2023    MCH 32.2 (H) 05/08/2023    MCHC 34.1 05/08/2023     Lab Results   Component Value Date    RDW 12.2 05/08/2023     (L) 05/08/2023    MPV 9.8 05/08/2023    GRAN 1.3 (L) 05/08/2023    GRAN 46.1 05/08/2023     LYMPH 1.2 05/08/2023    LYMPH 42.3 05/08/2023    MONO 0.3 05/08/2023    MONO 8.6 05/08/2023    EOSINOPHIL 2.4 05/08/2023    BASOPHIL 0.3 05/08/2023    EOS 0.1 05/08/2023    BASO 0.01 05/08/2023    GROUPTRH AB POS 05/08/2023    CHOL 104 (L) 03/17/2023    TRIG 75 03/17/2023    HDL 35 (L) 03/17/2023    CHOLHDL 33.7 03/17/2023    TOTALCHOLEST 3.0 03/17/2023    ALBUMIN 4.3 03/17/2023    AST 31 03/17/2023    ALT 39 03/17/2023    ALKPHOS 61 03/17/2023    LABPROT 11.1 03/17/2023    INR 1.0 03/17/2023       Assessment and Plan:  Orlin Zamarripa is a 57 y.o. male with cirrhosis likely from HCV, now cured. Current recommendations:  Cirrhosis, MELD <15: Recommend full serologic w/u for CLD; repeat meld labs  Portal htn and thrombocytopenia: EGD to screen for varices  Liver lesion hx: none on ct; recommend MRI abdo    Return 3 months

## 2023-06-01 LAB
ANA SER QL IF: NORMAL
HCV RNA SERPL QL NAA+PROBE: NOT DETECTED
HCV RNA SPEC NAA+PROBE-ACNC: <12 IU/ML
MITOCHONDRIA AB TITR SER IF: NORMAL {TITER}

## 2023-06-02 LAB
A1AT PHENOTYP SERPL-IMP: NORMAL BANDS
A1AT SERPL NEPH-MCNC: 117 MG/DL (ref 100–190)
CLINICAL BIOCHEMIST REVIEW: NORMAL
PLPETH BLD-MCNC: <10 NG/ML
POPETH BLD-MCNC: <10 NG/ML

## 2023-06-03 LAB
HBV E AB SER QL: NONREACTIVE
HBV E AG SERPL QL IA: NONREACTIVE
HBV E AG SERPL QL IA: NONREACTIVE

## 2023-06-05 LAB
HBV DNA SERPL NAA+PROBE-ACNC: <10 IU/ML
HBV DNA SERPL NAA+PROBE-LOG IU: <1 LOG (10) IU/ML
HBV DNA SERPL QL NAA+PROBE: NOT DETECTED

## 2023-06-06 LAB — SMOOTH MUSCLE AB TITR SER IF: ABNORMAL {TITER}

## 2023-06-12 ENCOUNTER — HOSPITAL ENCOUNTER (OUTPATIENT)
Dept: RADIOLOGY | Facility: HOSPITAL | Age: 58
Discharge: HOME | End: 2023-06-12
Attending: INTERNAL MEDICINE
Payer: COMMERCIAL

## 2023-06-12 DIAGNOSIS — Z87.19 HISTORY OF CIRRHOSIS OF LIVER: ICD-10-CM

## 2023-06-12 PROCEDURE — 74183 MRI ABDOMEN W WO CONTRAST: ICD-10-PCS | Mod: 26,,, | Performed by: RADIOLOGY

## 2023-06-12 PROCEDURE — 74183 MRI ABD W/O CNTR FLWD CNTR: CPT | Mod: 26,,, | Performed by: RADIOLOGY

## 2023-06-12 PROCEDURE — A9585 GADOBUTROL INJECTION: HCPCS | Performed by: INTERNAL MEDICINE

## 2023-06-12 PROCEDURE — 25500020 PHARM REV CODE 255: Performed by: INTERNAL MEDICINE

## 2023-06-12 PROCEDURE — 74183 MRI ABD W/O CNTR FLWD CNTR: CPT | Mod: TC

## 2023-06-12 RX ORDER — GADOBUTROL 604.72 MG/ML
10 INJECTION INTRAVENOUS
Status: COMPLETED | OUTPATIENT
Start: 2023-06-12 | End: 2023-06-12

## 2023-06-12 RX ADMIN — GADOBUTROL 10 ML: 604.72 INJECTION INTRAVENOUS at 09:06

## 2023-06-15 ENCOUNTER — DOCUMENTATION ONLY (OUTPATIENT)
Dept: CARDIOLOGY | Facility: CLINIC | Age: 58
End: 2023-06-15

## 2023-06-15 ENCOUNTER — OFFICE VISIT (OUTPATIENT)
Dept: CARDIOLOGY | Facility: CLINIC | Age: 58
End: 2023-06-15
Payer: COMMERCIAL

## 2023-06-15 ENCOUNTER — PATIENT MESSAGE (OUTPATIENT)
Dept: CARDIOLOGY | Facility: CLINIC | Age: 58
End: 2023-06-15

## 2023-06-15 VITALS
SYSTOLIC BLOOD PRESSURE: 106 MMHG | DIASTOLIC BLOOD PRESSURE: 68 MMHG | HEIGHT: 67 IN | BODY MASS INDEX: 27.57 KG/M2 | OXYGEN SATURATION: 96 % | HEART RATE: 61 BPM | WEIGHT: 175.69 LBS

## 2023-06-15 DIAGNOSIS — I25.118 CORONARY ARTERY DISEASE OF NATIVE ARTERY OF NATIVE HEART WITH STABLE ANGINA PECTORIS: Primary | ICD-10-CM

## 2023-06-15 DIAGNOSIS — D69.6 THROMBOCYTOPENIA: ICD-10-CM

## 2023-06-15 DIAGNOSIS — I10 PRIMARY HYPERTENSION: Primary | ICD-10-CM

## 2023-06-15 DIAGNOSIS — E78.2 MIXED HYPERLIPIDEMIA: ICD-10-CM

## 2023-06-15 DIAGNOSIS — K74.69 OTHER CIRRHOSIS OF LIVER: ICD-10-CM

## 2023-06-15 DIAGNOSIS — I25.118 CORONARY ARTERY DISEASE OF NATIVE ARTERY OF NATIVE HEART WITH STABLE ANGINA PECTORIS: ICD-10-CM

## 2023-06-15 PROCEDURE — 99214 OFFICE O/P EST MOD 30 MIN: CPT | Mod: S$GLB,,, | Performed by: INTERNAL MEDICINE

## 2023-06-15 PROCEDURE — 99214 PR OFFICE/OUTPT VISIT, EST, LEVL IV, 30-39 MIN: ICD-10-PCS | Mod: S$GLB,,, | Performed by: INTERNAL MEDICINE

## 2023-06-15 PROCEDURE — 3078F PR MOST RECENT DIASTOLIC BLOOD PRESSURE < 80 MM HG: ICD-10-PCS | Mod: CPTII,S$GLB,, | Performed by: INTERNAL MEDICINE

## 2023-06-15 PROCEDURE — 99999 PR PBB SHADOW E&M-EST. PATIENT-LVL IV: ICD-10-PCS | Mod: PBBFAC,,, | Performed by: INTERNAL MEDICINE

## 2023-06-15 PROCEDURE — 3008F BODY MASS INDEX DOCD: CPT | Mod: CPTII,S$GLB,, | Performed by: INTERNAL MEDICINE

## 2023-06-15 PROCEDURE — 3074F SYST BP LT 130 MM HG: CPT | Mod: CPTII,S$GLB,, | Performed by: INTERNAL MEDICINE

## 2023-06-15 PROCEDURE — 3078F DIAST BP <80 MM HG: CPT | Mod: CPTII,S$GLB,, | Performed by: INTERNAL MEDICINE

## 2023-06-15 PROCEDURE — 3008F PR BODY MASS INDEX (BMI) DOCUMENTED: ICD-10-PCS | Mod: CPTII,S$GLB,, | Performed by: INTERNAL MEDICINE

## 2023-06-15 PROCEDURE — 3074F PR MOST RECENT SYSTOLIC BLOOD PRESSURE < 130 MM HG: ICD-10-PCS | Mod: CPTII,S$GLB,, | Performed by: INTERNAL MEDICINE

## 2023-06-15 PROCEDURE — 99999 PR PBB SHADOW E&M-EST. PATIENT-LVL IV: CPT | Mod: PBBFAC,,, | Performed by: INTERNAL MEDICINE

## 2023-06-15 NOTE — H&P (VIEW-ONLY)
Subjective:    Patient ID:  Orlin Zamarripa is a 57 y.o. male who presents for follow-up of Coronary Artery Disease and Shortness of Breath      Referring physician: Dr. Mohan    HPI  Mr. Zamarripa is a 57 y.o. male with HTN, HLD, CAD and former smoker who presents to IC clinic to discuss multivessel PCI. He has been having exertional chest pain for approximately 1 year, that is worsened lately.  His chest pain is relieved with rest.  He had an angiogram performed at outside hospital, which revealed  to RCA, severe stenosis to mid LAD, and severe stenosis to distal left circumflex artery.  He was evaluated by Dr. Mohan for bypass surgery, however, he was deemed not to be a candidate given cirrhosis/HCV.  He underwent PCI of the LAD On 5/8/23.  Since then he reports a significant decrease in his chest pain and shortness of breath with exertion.  IF currently only occurs with mowing his lawn and vigorous physical activity.  Denies palpitations, lower extremity edema, PND, orthopnea, or any other acute events.      Past Medical History:   Diagnosis Date    Hypertension     Mixed hyperlipidemia        Past Surgical History:   Procedure Laterality Date    CARDIAC CATHETERIZATION  03/20/2023    CORONARY ANGIOGRAPHY Right 3/20/2023    Procedure: ANGIOGRAM, CORONARY ARTERY;  Surgeon: Esequiel Zamarripa MD;  Location: [facility] CATH LAB;  Service: Cardiology;  Laterality: Right;  radial    CORONARY ANGIOGRAPHY N/A 5/8/2023    Procedure: ANGIOGRAM, CORONARY ARTERY;  Surgeon: Jose Hinton MD;  Location: [facility] CATH LAB;  Service: Cardiology;  Laterality: N/A;    INSTANTANEOUS WAVE-FREE RATIO (IFR) N/A 5/8/2023    Procedure: Instantaneous Wave-Free Ratio (IFR);  Surgeon: Jose Hinton MD;  Location: [facility] CATH LAB;  Service: Cardiology;  Laterality: N/A;    IVUS, CORONARY  5/8/2023    Procedure: IVUS, Coronary;  Surgeon: Jose Hinton MD;  Location: [facility] CATH LAB;  Service: Cardiology;;    LEFT HEART CATHETERIZATION  Left 5/8/2023    Procedure: Left heart cath;  Surgeon: Jose Hinton MD;  Location: [facility] CATH LAB;  Service: Cardiology;  Laterality: Left;    STENT, DRUG ELUTING, MULTI VESSEL, CORONARY Left 5/8/2023    Procedure: Stent, Drug Eluting, Multi Vessel, Coronary;  Surgeon: Jose Hinton MD;  Location: [facility] CATH LAB;  Service: Cardiology;  Laterality: Left;  low bleeding risk 2.2%       Current Outpatient Medications on File Prior to Visit   Medication Sig Dispense Refill    amLODIPine (NORVASC) 5 MG tablet Take 5 mg by mouth once daily.      aspirin (ECOTRIN) 81 MG EC tablet Take 81 mg by mouth once daily.      atorvastatin (LIPITOR) 20 MG tablet Take 20 mg by mouth once daily.      clopidogreL (PLAVIX) 75 mg tablet Take 4 tablets (300mg) this evening, followed by 1 tablet (75mg) thereafter. 30 tablet 11    ergocalciferol (ERGOCALCIFEROL) 50,000 unit Cap Take 50,000 Units by mouth every 7 days.      metoprolol tartrate (LOPRESSOR) 25 MG tablet Take 25 mg by mouth.      nitroGLYCERIN (NITROSTAT) 0.4 MG SL tablet Place 1 tablet (0.4 mg total) under the tongue every 5 (five) minutes as needed for Chest pain. 30 tablet 3     Current Facility-Administered Medications on File Prior to Visit   Medication Dose Route Frequency Provider Last Rate Last Admin    sodium chloride 0.9% flush 10 mL  10 mL Intravenous PRN Cristofer Hernandez MD           Review of patient's allergies indicates:  No Known Allergies    Social History     Tobacco Use    Smoking status: Former     Types: Cigarettes     Quit date: 4/20/2020     Years since quitting: 3.1    Smokeless tobacco: Never   Substance Use Topics    Alcohol use: Yes     Alcohol/week: 2.0 standard drinks     Types: 1 Glasses of wine, 1 Cans of beer per week     Comment: rare    Drug use: Never       History reviewed. No pertinent family history.      Review of Systems   Constitutional: Negative for decreased appetite, diaphoresis, fever, malaise/fatigue, weight gain and  "weight loss.   HENT:  Negative for congestion, nosebleeds and sore throat.    Eyes:  Negative for blurred vision, vision loss in left eye, vision loss in right eye and visual disturbance.   Cardiovascular:  Positive for chest pain and dyspnea on exertion. Negative for claudication, leg swelling, near-syncope, orthopnea, palpitations, paroxysmal nocturnal dyspnea and syncope.   Respiratory:  Negative for cough, hemoptysis, shortness of breath and wheezing.    Endocrine: Negative for polyuria.   Hematologic/Lymphatic: Does not bruise/bleed easily.   Skin:  Negative for nail changes and rash.   Musculoskeletal:  Negative for back pain, muscle cramps and myalgias.   Gastrointestinal:  Negative for abdominal pain, change in bowel habit, diarrhea, heartburn, hematemesis, hematochezia, melena, nausea and vomiting.   Genitourinary:  Negative for bladder incontinence, dysuria, frequency and hematuria.   Psychiatric/Behavioral:  Negative for depression.    Allergic/Immunologic: Negative for hives.      Objective:     Vitals:    06/15/23 1026 06/15/23 1032   BP: 105/64 106/68   BP Location: Left arm Right arm   Patient Position: Sitting Sitting   BP Method: Large (Automatic) Large (Automatic)   Pulse: 60 61   SpO2: 96%    Weight: 79.7 kg (175 lb 11.3 oz)    Height: 5' 7" (1.702 m)         Physical Exam  Constitutional:       Appearance: Normal appearance. He is well-developed.   HENT:      Head: Normocephalic and atraumatic.   Eyes:      Extraocular Movements: Extraocular movements intact.   Neck:      Thyroid: No thyromegaly.      Vascular: No JVD.   Cardiovascular:      Rate and Rhythm: Normal rate and regular rhythm.      Chest Wall: PMI is displaced.      Pulses:           Carotid pulses are 2+ on the right side and 2+ on the left side.       Radial pulses are 2+ on the right side and 2+ on the left side.        Femoral pulses are 2+ on the right side and 2+ on the left side.       Dorsalis pedis pulses are 2+ on the right " side and 2+ on the left side.        Posterior tibial pulses are 2+ on the right side and 2+ on the left side.      Heart sounds: No murmur heard.    No friction rub. No gallop.      Comments: Normal right radial Kolton's test.  Pulmonary:      Effort: Pulmonary effort is normal.      Breath sounds: No wheezing, rhonchi or rales.   Abdominal:      General: There is no distension.      Palpations: Abdomen is soft.      Tenderness: There is no abdominal tenderness.   Musculoskeletal:      Cervical back: Neck supple.   Skin:     General: Skin is warm and dry.   Neurological:      Mental Status: He is alert and oriented to person, place, and time.      Gait: Gait normal.   Psychiatric:         Mood and Affect: Mood normal.         Behavior: Behavior normal.         Assessment:       1. Primary hypertension    2. Mixed hyperlipidemia    3. Coronary artery disease of native artery of native heart with stable angina pectoris    4. Thrombocytopenia    5. Other cirrhosis of liver         Plan:       1) coronary artery disease.  The patient reports symptoms consistent with CCS 3 angina.  I reviewed the patient's coronary angiogram from the referring hospital.  He has a long high-grade mid LAD stenosis, left circ stenosis and a  of the RCA with left-to-right collaterals.  He was turned down for CABG.  Since undergoing LAD PCI he reports a decrease in his anginal syndrome, but the patient would  like more relief so he can be more physically active. I discussed medical management and RCA  PCI. I discussed the increased risks of  PCI compared to standard PCI and the possibility of aborting the procedure prior to stent placement if certain safety thresholds are reached.  The patient opted to proceed with RCA  PCI.    -8Fr bilateral CFA access   -continue enteric-coated aspirin 81 mg p.o. q.d.  -continue Plavix 75 mg p.o. q.day  The risks, benefits, and alternatives of cardiac catheterization and  PCI were discussed  with the patient.  All questions were answered and informed consent was obtained.  -stop isosorbide mononitrate 30 mg p.o. q.day as the patient reports this may be causing him to have posterior thigh pain  -the patient cannot take Ranexa as he has cirrhossi  -his lopressor cannot be titrated up as his HR is 60     2) hypertension.  The patient's blood pressure is adequately controlled in clinic today.  Continue Norvasc 5 mg p.o. q.day     3) dyslipidemia.  03/17/2023 lipid panel reviewed.  Continue Lipitor 20 mg p.o. q.day unless hepatology deems this should be stopped in light of the patient's liver disease     4) HCV cirrhosis.  Rec follow-up with hepatology      5) thrombocytopenia.  The patient's platelet count was 160 on 5/31/23; this is likely secondary to his liver disease; reassess prior to PCI as he is starting Plavix     All of the patient's questions were answered.

## 2023-06-15 NOTE — PROGRESS NOTES
Subjective:    Patient ID:  Orlin Zamarripa is a 57 y.o. male who presents for follow-up of Coronary Artery Disease and Shortness of Breath      Referring physician: Dr. Mohan    HPI  Mr. Zamarripa is a 57 y.o. male with HTN, HLD, CAD and former smoker who presents to IC clinic to discuss multivessel PCI. He has been having exertional chest pain for approximately 1 year, that is worsened lately.  His chest pain is relieved with rest.  He had an angiogram performed at outside hospital, which revealed  to RCA, severe stenosis to mid LAD, and severe stenosis to distal left circumflex artery.  He was evaluated by Dr. Mohan for bypass surgery, however, he was deemed not to be a candidate given cirrhosis/HCV.  He underwent PCI of the LAD On 5/8/23.  Since then he reports a significant decrease in his chest pain and shortness of breath with exertion.  IF currently only occurs with mowing his lawn and vigorous physical activity.  Denies palpitations, lower extremity edema, PND, orthopnea, or any other acute events.      Past Medical History:   Diagnosis Date    Hypertension     Mixed hyperlipidemia        Past Surgical History:   Procedure Laterality Date    CARDIAC CATHETERIZATION  03/20/2023    CORONARY ANGIOGRAPHY Right 3/20/2023    Procedure: ANGIOGRAM, CORONARY ARTERY;  Surgeon: Esequiel Zamarripa MD;  Location: [facility] [place] [facility];  Service: Cardiology;  Laterality: Right;  radial    CORONARY ANGIOGRAPHY N/A 5/8/2023    Procedure: ANGIOGRAM, CORONARY ARTERY;  Surgeon: Jose Hinton MD;  Location: [facility] CATH LAB;  Service: Cardiology;  Laterality: N/A;    INSTANTANEOUS WAVE-FREE RATIO (IFR) N/A 5/8/2023    Procedure: Instantaneous Wave-Free Ratio (IFR);  Surgeon: Jose Hinton MD;  Location: [facility] CATH LAB;  Service: Cardiology;  Laterality: N/A;    IVUS, CORONARY  5/8/2023    Procedure: IVUS, Coronary;  Surgeon: Joes Hinton MD;  Location: [facility] CATH LAB;  Service: Cardiology;;    LEFT HEART CATHETERIZATION  Left 5/8/2023    Procedure: Left heart cath;  Surgeon: Jose Hinton MD;  Location: [facility] CATH LAB;  Service: Cardiology;  Laterality: Left;    STENT, DRUG ELUTING, MULTI VESSEL, CORONARY Left 5/8/2023    Procedure: Stent, Drug Eluting, Multi Vessel, Coronary;  Surgeon: Jose Hinton MD;  Location: [facility] CATH LAB;  Service: Cardiology;  Laterality: Left;  low bleeding risk 2.2%       Current Outpatient Medications on File Prior to Visit   Medication Sig Dispense Refill    amLODIPine (NORVASC) 5 MG tablet Take 5 mg by mouth once daily.      aspirin (ECOTRIN) 81 MG EC tablet Take 81 mg by mouth once daily.      atorvastatin (LIPITOR) 20 MG tablet Take 20 mg by mouth once daily.      clopidogreL (PLAVIX) 75 mg tablet Take 4 tablets (300mg) this evening, followed by 1 tablet (75mg) thereafter. 30 tablet 11    ergocalciferol (ERGOCALCIFEROL) 50,000 unit Cap Take 50,000 Units by mouth every 7 days.      metoprolol tartrate (LOPRESSOR) 25 MG tablet Take 25 mg by mouth.      nitroGLYCERIN (NITROSTAT) 0.4 MG SL tablet Place 1 tablet (0.4 mg total) under the tongue every 5 (five) minutes as needed for Chest pain. 30 tablet 3     Current Facility-Administered Medications on File Prior to Visit   Medication Dose Route Frequency Provider Last Rate Last Admin    sodium chloride 0.9% flush 10 mL  10 mL Intravenous PRN Cristofer Hernandez MD           Review of patient's allergies indicates:  No Known Allergies    Social History     Tobacco Use    Smoking status: Former     Types: Cigarettes     Quit date: 4/20/2020     Years since quitting: 3.1    Smokeless tobacco: Never   Substance Use Topics    Alcohol use: Yes     Alcohol/week: 2.0 standard drinks     Types: 1 Glasses of wine, 1 Cans of beer per week     Comment: rare    Drug use: Never       History reviewed. No pertinent family history.      Review of Systems   Constitutional: Negative for decreased appetite, diaphoresis, fever, malaise/fatigue, weight gain and  "weight loss.   HENT:  Negative for congestion, nosebleeds and sore throat.    Eyes:  Negative for blurred vision, vision loss in left eye, vision loss in right eye and visual disturbance.   Cardiovascular:  Positive for chest pain and dyspnea on exertion. Negative for claudication, leg swelling, near-syncope, orthopnea, palpitations, paroxysmal nocturnal dyspnea and syncope.   Respiratory:  Negative for cough, hemoptysis, shortness of breath and wheezing.    Endocrine: Negative for polyuria.   Hematologic/Lymphatic: Does not bruise/bleed easily.   Skin:  Negative for nail changes and rash.   Musculoskeletal:  Negative for back pain, muscle cramps and myalgias.   Gastrointestinal:  Negative for abdominal pain, change in bowel habit, diarrhea, heartburn, hematemesis, hematochezia, melena, nausea and vomiting.   Genitourinary:  Negative for bladder incontinence, dysuria, frequency and hematuria.   Psychiatric/Behavioral:  Negative for depression.    Allergic/Immunologic: Negative for hives.      Objective:     Vitals:    06/15/23 1026 06/15/23 1032   BP: 105/64 106/68   BP Location: Left arm Right arm   Patient Position: Sitting Sitting   BP Method: Large (Automatic) Large (Automatic)   Pulse: 60 61   SpO2: 96%    Weight: 79.7 kg (175 lb 11.3 oz)    Height: 5' 7" (1.702 m)         Physical Exam  Constitutional:       Appearance: Normal appearance. He is well-developed.   HENT:      Head: Normocephalic and atraumatic.   Eyes:      Extraocular Movements: Extraocular movements intact.   Neck:      Thyroid: No thyromegaly.      Vascular: No JVD.   Cardiovascular:      Rate and Rhythm: Normal rate and regular rhythm.      Chest Wall: PMI is displaced.      Pulses:           Carotid pulses are 2+ on the right side and 2+ on the left side.       Radial pulses are 2+ on the right side and 2+ on the left side.        Femoral pulses are 2+ on the right side and 2+ on the left side.       Dorsalis pedis pulses are 2+ on the right " side and 2+ on the left side.        Posterior tibial pulses are 2+ on the right side and 2+ on the left side.      Heart sounds: No murmur heard.    No friction rub. No gallop.      Comments: Normal right radial Kolton's test.  Pulmonary:      Effort: Pulmonary effort is normal.      Breath sounds: No wheezing, rhonchi or rales.   Abdominal:      General: There is no distension.      Palpations: Abdomen is soft.      Tenderness: There is no abdominal tenderness.   Musculoskeletal:      Cervical back: Neck supple.   Skin:     General: Skin is warm and dry.   Neurological:      Mental Status: He is alert and oriented to person, place, and time.      Gait: Gait normal.   Psychiatric:         Mood and Affect: Mood normal.         Behavior: Behavior normal.         Assessment:       1. Primary hypertension    2. Mixed hyperlipidemia    3. Coronary artery disease of native artery of native heart with stable angina pectoris    4. Thrombocytopenia    5. Other cirrhosis of liver         Plan:       1) coronary artery disease.  The patient reports symptoms consistent with CCS 3 angina.  I reviewed the patient's coronary angiogram from the referring hospital.  He has a long high-grade mid LAD stenosis, left circ stenosis and a  of the RCA with left-to-right collaterals.  He was turned down for CABG.  Since undergoing LAD PCI he reports a decrease in his anginal syndrome, but the patient would  like more relief so he can be more physically active. I discussed medical management and RCA  PCI. I discussed the increased risks of  PCI compared to standard PCI and the possibility of aborting the procedure prior to stent placement if certain safety thresholds are reached.  The patient opted to proceed with RCA  PCI.    -8Fr bilateral CFA access   -continue enteric-coated aspirin 81 mg p.o. q.d.  -continue Plavix 75 mg p.o. q.day  The risks, benefits, and alternatives of cardiac catheterization and  PCI were discussed  with the patient.  All questions were answered and informed consent was obtained.  -stop isosorbide mononitrate 30 mg p.o. q.day as the patient reports this may be causing him to have posterior thigh pain  -the patient cannot take Ranexa as he has cirrhossi  -his lopressor cannot be titrated up as his HR is 60     2) hypertension.  The patient's blood pressure is adequately controlled in clinic today.  Continue Norvasc 5 mg p.o. q.day     3) dyslipidemia.  03/17/2023 lipid panel reviewed.  Continue Lipitor 20 mg p.o. q.day unless hepatology deems this should be stopped in light of the patient's liver disease     4) HCV cirrhosis.  Rec follow-up with hepatology      5) thrombocytopenia.  The patient's platelet count was 160 on 5/31/23; this is likely secondary to his liver disease; reassess prior to PCI as he is starting Plavix     All of the patient's questions were answered.

## 2023-07-06 ENCOUNTER — LAB VISIT (OUTPATIENT)
Dept: LAB | Facility: HOSPITAL | Age: 58
End: 2023-07-06
Attending: INTERNAL MEDICINE
Payer: COMMERCIAL

## 2023-07-06 DIAGNOSIS — I25.118 CORONARY ARTERY DISEASE OF NATIVE ARTERY OF NATIVE HEART WITH STABLE ANGINA PECTORIS: ICD-10-CM

## 2023-07-06 LAB
ALBUMIN SERPL BCP-MCNC: 4.2 G/DL (ref 3.5–5.2)
ALP SERPL-CCNC: 53 U/L (ref 55–135)
ALT SERPL W/O P-5'-P-CCNC: 37 U/L (ref 10–44)
ANION GAP SERPL CALC-SCNC: 6 MMOL/L (ref 8–16)
AST SERPL-CCNC: 30 U/L (ref 10–40)
BASOPHILS # BLD AUTO: 0.01 K/UL (ref 0–0.2)
BASOPHILS NFR BLD: 0.3 % (ref 0–1.9)
BILIRUB SERPL-MCNC: 0.7 MG/DL (ref 0.1–1)
BUN SERPL-MCNC: 19 MG/DL (ref 6–20)
CALCIUM SERPL-MCNC: 9 MG/DL (ref 8.7–10.5)
CHLORIDE SERPL-SCNC: 108 MMOL/L (ref 95–110)
CO2 SERPL-SCNC: 25 MMOL/L (ref 23–29)
CREAT SERPL-MCNC: 1 MG/DL (ref 0.5–1.4)
DIFFERENTIAL METHOD: ABNORMAL
EOSINOPHIL # BLD AUTO: 0.1 K/UL (ref 0–0.5)
EOSINOPHIL NFR BLD: 1.9 % (ref 0–8)
ERYTHROCYTE [DISTWIDTH] IN BLOOD BY AUTOMATED COUNT: 12.2 % (ref 11.5–14.5)
EST. GFR  (NO RACE VARIABLE): >60 ML/MIN/1.73 M^2
GLUCOSE SERPL-MCNC: 106 MG/DL (ref 70–110)
HCT VFR BLD AUTO: 40.4 % (ref 40–54)
HGB BLD-MCNC: 13.6 G/DL (ref 14–18)
IMM GRANULOCYTES # BLD AUTO: 0.01 K/UL (ref 0–0.04)
IMM GRANULOCYTES NFR BLD AUTO: 0.3 % (ref 0–0.5)
INR PPP: 1 (ref 0.8–1.2)
LYMPHOCYTES # BLD AUTO: 1.1 K/UL (ref 1–4.8)
LYMPHOCYTES NFR BLD: 35 % (ref 18–48)
MCH RBC QN AUTO: 31.3 PG (ref 27–31)
MCHC RBC AUTO-ENTMCNC: 33.7 G/DL (ref 32–36)
MCV RBC AUTO: 93 FL (ref 82–98)
MONOCYTES # BLD AUTO: 0.3 K/UL (ref 0.3–1)
MONOCYTES NFR BLD: 8.4 % (ref 4–15)
NEUTROPHILS # BLD AUTO: 1.7 K/UL (ref 1.8–7.7)
NEUTROPHILS NFR BLD: 54.1 % (ref 38–73)
NRBC BLD-RTO: 0 /100 WBC
PLATELET # BLD AUTO: 141 K/UL (ref 150–450)
PMV BLD AUTO: 9.8 FL (ref 9.2–12.9)
POTASSIUM SERPL-SCNC: 4.3 MMOL/L (ref 3.5–5.1)
PROT SERPL-MCNC: 7.6 G/DL (ref 6–8.4)
PROTHROMBIN TIME: 10.9 SEC (ref 9–12.5)
RBC # BLD AUTO: 4.34 M/UL (ref 4.6–6.2)
SODIUM SERPL-SCNC: 139 MMOL/L (ref 136–145)
WBC # BLD AUTO: 3.11 K/UL (ref 3.9–12.7)

## 2023-07-06 PROCEDURE — 85610 PROTHROMBIN TIME: CPT | Performed by: INTERNAL MEDICINE

## 2023-07-06 PROCEDURE — 85025 COMPLETE CBC W/AUTO DIFF WBC: CPT | Performed by: INTERNAL MEDICINE

## 2023-07-06 PROCEDURE — 80053 COMPREHEN METABOLIC PANEL: CPT | Performed by: INTERNAL MEDICINE

## 2023-07-06 PROCEDURE — 36415 COLL VENOUS BLD VENIPUNCTURE: CPT | Mod: PO | Performed by: INTERNAL MEDICINE

## 2023-07-08 ENCOUNTER — NURSE TRIAGE (OUTPATIENT)
Dept: ADMINISTRATIVE | Facility: CLINIC | Age: 58
End: 2023-07-08
Payer: COMMERCIAL

## 2023-07-08 NOTE — TELEPHONE ENCOUNTER
Pts wife calling and pt is having a procedure on Monday and instructions say to take ASA and plavix am of surgery. Wife is saying that she doesn't see that he is on plavix.  I looked in the EMR and I told her it may have been under another name of Clopidogrel and the wife said that she will look once she gets home from work. Pt looks like he has been on it since 4/23. She was instructed to call back if any other questions or concerns once she gets home. Pts wife said that she would. Voices no other needs at this time.    Reason for Disposition   Health Information question, no triage required and triager able to answer question    Protocols used: Information Only Call - No Triage-A-

## 2023-07-10 ENCOUNTER — HOSPITAL ENCOUNTER (OUTPATIENT)
Facility: HOSPITAL | Age: 58
Discharge: HOME | End: 2023-07-10
Attending: INTERNAL MEDICINE | Admitting: INTERNAL MEDICINE
Payer: COMMERCIAL

## 2023-07-10 ENCOUNTER — TELEPHONE (OUTPATIENT)
Dept: CARDIAC REHAB | Facility: CLINIC | Age: 58
End: 2023-07-10
Payer: COMMERCIAL

## 2023-07-10 VITALS
SYSTOLIC BLOOD PRESSURE: 121 MMHG | BODY MASS INDEX: 27.47 KG/M2 | DIASTOLIC BLOOD PRESSURE: 74 MMHG | OXYGEN SATURATION: 97 % | HEIGHT: 67 IN | TEMPERATURE: 97 F | WEIGHT: 175 LBS | HEART RATE: 67 BPM | RESPIRATION RATE: 17 BRPM

## 2023-07-10 DIAGNOSIS — I25.10 CAD (CORONARY ARTERY DISEASE): ICD-10-CM

## 2023-07-10 DIAGNOSIS — Z98.890 STATUS POST LEFT HEART CATHETERIZATION: ICD-10-CM

## 2023-07-10 DIAGNOSIS — I25.10 CORONARY ARTERY DISEASE DUE TO CALCIFIED CORONARY LESION: ICD-10-CM

## 2023-07-10 DIAGNOSIS — I25.84 CORONARY ARTERY DISEASE DUE TO CALCIFIED CORONARY LESION: ICD-10-CM

## 2023-07-10 DIAGNOSIS — I10 PRIMARY HYPERTENSION: Primary | ICD-10-CM

## 2023-07-10 LAB
ABO + RH BLD: NORMAL
BLD GP AB SCN CELLS X3 SERPL QL: NORMAL
ERYTHROCYTE [DISTWIDTH] IN BLOOD BY AUTOMATED COUNT: 12.2 % (ref 11.5–14.5)
HCT VFR BLD AUTO: 37.7 % (ref 40–54)
HGB BLD-MCNC: 13.2 G/DL (ref 14–18)
MCH RBC QN AUTO: 32.7 PG (ref 27–31)
MCHC RBC AUTO-ENTMCNC: 35 G/DL (ref 32–36)
MCV RBC AUTO: 93 FL (ref 82–98)
PLATELET # BLD AUTO: 123 K/UL (ref 150–450)
PMV BLD AUTO: 9.6 FL (ref 9.2–12.9)
POCT GLUCOSE: 120 MG/DL (ref 70–110)
RBC # BLD AUTO: 4.04 M/UL (ref 4.6–6.2)
WBC # BLD AUTO: 3.5 K/UL (ref 3.9–12.7)

## 2023-07-10 PROCEDURE — 92943 PR CTO: ICD-10-PCS | Mod: RC,,, | Performed by: INTERNAL MEDICINE

## 2023-07-10 PROCEDURE — 86900 BLOOD TYPING SEROLOGIC ABO: CPT | Performed by: INTERNAL MEDICINE

## 2023-07-10 PROCEDURE — C1769 GUIDE WIRE: HCPCS | Performed by: INTERNAL MEDICINE

## 2023-07-10 PROCEDURE — 99152 MOD SED SAME PHYS/QHP 5/>YRS: CPT | Performed by: INTERNAL MEDICINE

## 2023-07-10 PROCEDURE — C1887 CATHETER, GUIDING: HCPCS | Performed by: INTERNAL MEDICINE

## 2023-07-10 PROCEDURE — 99153 MOD SED SAME PHYS/QHP EA: CPT | Performed by: INTERNAL MEDICINE

## 2023-07-10 PROCEDURE — 63600175 PHARM REV CODE 636 W HCPCS: Performed by: INTERNAL MEDICINE

## 2023-07-10 PROCEDURE — 93458 L HRT ARTERY/VENTRICLE ANGIO: CPT | Mod: 26,59,, | Performed by: INTERNAL MEDICINE

## 2023-07-10 PROCEDURE — 85347 COAGULATION TIME ACTIVATED: CPT | Performed by: INTERNAL MEDICINE

## 2023-07-10 PROCEDURE — 93010 ELECTROCARDIOGRAM REPORT: CPT | Mod: ,,, | Performed by: INTERNAL MEDICINE

## 2023-07-10 PROCEDURE — C1725 CATH, TRANSLUMIN NON-LASER: HCPCS | Performed by: INTERNAL MEDICINE

## 2023-07-10 PROCEDURE — 25000003 PHARM REV CODE 250: Performed by: STUDENT IN AN ORGANIZED HEALTH CARE EDUCATION/TRAINING PROGRAM

## 2023-07-10 PROCEDURE — 93010 EKG 12-LEAD: ICD-10-PCS | Mod: ,,, | Performed by: INTERNAL MEDICINE

## 2023-07-10 PROCEDURE — 25000003 PHARM REV CODE 250: Performed by: INTERNAL MEDICINE

## 2023-07-10 PROCEDURE — 92943 PRQ TRLUML REVSC CH OCC ANT: CPT | Mod: RC,,, | Performed by: INTERNAL MEDICINE

## 2023-07-10 PROCEDURE — C1753 CATH, INTRAVAS ULTRASOUND: HCPCS | Performed by: INTERNAL MEDICINE

## 2023-07-10 PROCEDURE — 93458 L HRT ARTERY/VENTRICLE ANGIO: CPT | Mod: 59 | Performed by: INTERNAL MEDICINE

## 2023-07-10 PROCEDURE — 85027 COMPLETE CBC AUTOMATED: CPT | Performed by: STUDENT IN AN ORGANIZED HEALTH CARE EDUCATION/TRAINING PROGRAM

## 2023-07-10 PROCEDURE — 92978 ENDOLUMINL IVUS OCT C 1ST: CPT | Mod: RC | Performed by: INTERNAL MEDICINE

## 2023-07-10 PROCEDURE — 99152 PR MOD CONSCIOUS SEDATION, SAME PHYS, 5+ YRS, FIRST 15 MIN: ICD-10-PCS | Mod: ,,, | Performed by: INTERNAL MEDICINE

## 2023-07-10 PROCEDURE — 94761 N-INVAS EAR/PLS OXIMETRY MLT: CPT | Mod: 59

## 2023-07-10 PROCEDURE — C9607 PERC D-E COR REVASC CHRO SIN: HCPCS | Mod: RC | Performed by: INTERNAL MEDICINE

## 2023-07-10 PROCEDURE — C1894 INTRO/SHEATH, NON-LASER: HCPCS | Performed by: INTERNAL MEDICINE

## 2023-07-10 PROCEDURE — 93005 ELECTROCARDIOGRAM TRACING: CPT | Mod: 59

## 2023-07-10 PROCEDURE — 92978 ENDOLUMINL IVUS OCT C 1ST: CPT | Mod: 26,RC,, | Performed by: INTERNAL MEDICINE

## 2023-07-10 PROCEDURE — 92978 PR IVUS, CORONARY, 1ST VESSEL: ICD-10-PCS | Mod: 26,RC,, | Performed by: INTERNAL MEDICINE

## 2023-07-10 PROCEDURE — C1874 STENT, COATED/COV W/DEL SYS: HCPCS | Performed by: INTERNAL MEDICINE

## 2023-07-10 PROCEDURE — C1760 CLOSURE DEV, VASC: HCPCS | Performed by: INTERNAL MEDICINE

## 2023-07-10 PROCEDURE — 27201423 OPTIME MED/SURG SUP & DEVICES STERILE SUPPLY: Performed by: INTERNAL MEDICINE

## 2023-07-10 PROCEDURE — 93458 PR CATH PLACE/CORON ANGIO, IMG SUPER/INTERP,W LEFT HEART VENTRICULOGRAPHY: ICD-10-PCS | Mod: 26,59,, | Performed by: INTERNAL MEDICINE

## 2023-07-10 PROCEDURE — 25500020 PHARM REV CODE 255: Performed by: INTERNAL MEDICINE

## 2023-07-10 PROCEDURE — 99152 MOD SED SAME PHYS/QHP 5/>YRS: CPT | Mod: ,,, | Performed by: INTERNAL MEDICINE

## 2023-07-10 DEVICE — EVEROLIMUS-ELUTING PLATINUM CHROMIUM CORONARY STENT SYSTEM
Type: IMPLANTABLE DEVICE | Site: CORONARY | Status: FUNCTIONAL
Brand: SYNERGY™ XD

## 2023-07-10 DEVICE — ANGIO-SEAL VIP VASCULAR CLOSURE DEVICE
Type: IMPLANTABLE DEVICE | Site: ARTERIAL | Status: FUNCTIONAL
Brand: ANGIO-SEAL

## 2023-07-10 RX ORDER — FENTANYL CITRATE 50 UG/ML
INJECTION, SOLUTION INTRAMUSCULAR; INTRAVENOUS
Status: DISCONTINUED | OUTPATIENT
Start: 2023-07-10 | End: 2023-07-10 | Stop reason: HOSPADM

## 2023-07-10 RX ORDER — CEFAZOLIN SODIUM 1 G/3ML
INJECTION, POWDER, FOR SOLUTION INTRAMUSCULAR; INTRAVENOUS
Status: DISCONTINUED | OUTPATIENT
Start: 2023-07-10 | End: 2023-07-10 | Stop reason: HOSPADM

## 2023-07-10 RX ORDER — LIDOCAINE HYDROCHLORIDE 20 MG/ML
INJECTION, SOLUTION EPIDURAL; INFILTRATION; INTRACAUDAL; PERINEURAL
Status: DISCONTINUED | OUTPATIENT
Start: 2023-07-10 | End: 2023-07-10 | Stop reason: HOSPADM

## 2023-07-10 RX ORDER — MIDAZOLAM HYDROCHLORIDE 1 MG/ML
INJECTION, SOLUTION INTRAMUSCULAR; INTRAVENOUS
Status: DISCONTINUED | OUTPATIENT
Start: 2023-07-10 | End: 2023-07-10 | Stop reason: HOSPADM

## 2023-07-10 RX ORDER — HEPARIN SOD,PORCINE/0.9 % NACL 1000/500ML
INTRAVENOUS SOLUTION INTRAVENOUS
Status: DISCONTINUED | OUTPATIENT
Start: 2023-07-10 | End: 2023-07-10 | Stop reason: HOSPADM

## 2023-07-10 RX ORDER — HEPARIN SODIUM 1000 [USP'U]/ML
INJECTION, SOLUTION INTRAVENOUS; SUBCUTANEOUS
Status: DISCONTINUED | OUTPATIENT
Start: 2023-07-10 | End: 2023-07-10 | Stop reason: HOSPADM

## 2023-07-10 RX ORDER — SODIUM CHLORIDE 9 MG/ML
INJECTION, SOLUTION INTRAVENOUS CONTINUOUS
Status: ACTIVE | OUTPATIENT
Start: 2023-07-10

## 2023-07-10 RX ORDER — ACETAMINOPHEN 325 MG/1
650 TABLET ORAL EVERY 4 HOURS PRN
Status: DISCONTINUED | OUTPATIENT
Start: 2023-07-10 | End: 2023-07-10 | Stop reason: HOSPADM

## 2023-07-10 RX ORDER — ONDANSETRON 4 MG/1
8 TABLET, ORALLY DISINTEGRATING ORAL EVERY 8 HOURS PRN
Status: DISCONTINUED | OUTPATIENT
Start: 2023-07-10 | End: 2023-07-10 | Stop reason: HOSPADM

## 2023-07-10 RX ORDER — DIPHENHYDRAMINE HCL 50 MG
50 CAPSULE ORAL ONCE
Status: COMPLETED | OUTPATIENT
Start: 2023-07-10 | End: 2023-07-10

## 2023-07-10 RX ORDER — SODIUM CHLORIDE 9 MG/ML
INJECTION, SOLUTION INTRAVENOUS CONTINUOUS
Status: ACTIVE | OUTPATIENT
Start: 2023-07-10 | End: 2023-07-10

## 2023-07-10 RX ADMIN — SODIUM CHLORIDE: 0.9 INJECTION, SOLUTION INTRAVENOUS at 10:07

## 2023-07-10 RX ADMIN — SODIUM CHLORIDE: 9 INJECTION, SOLUTION INTRAVENOUS at 07:07

## 2023-07-10 RX ADMIN — DIPHENHYDRAMINE HYDROCHLORIDE 50 MG: 50 CAPSULE ORAL at 07:07

## 2023-07-10 NOTE — ASSESSMENT & PLAN NOTE
Sever coronary artery disease is present. See catheterization report for full details.  Patent LAD and diagonal stents.    midRCA with left and right collaterals.   Anterograde  wire crossing 3.0 x 32 SAMRA with zero residual stenosis.   Pre procedure LVEDP 12.     - Cardiac rehab referral  - Continue medical management,   - Follow-up with outpatient cardiologist  - Continue ASA and Plavix.

## 2023-07-10 NOTE — NURSING
Pt arrived to the unit, pt stable at time oft transfer, no signs or symptoms of distress noted. Vitals signs wnl, no complaints of chest pain. Pulses palpable +2, pt admission not done, pt planned to leave at 1700 short term stay.will continue to monitor.

## 2023-07-10 NOTE — CARE UPDATE
I called the patient's wife and updated her that the patient's RCA  PCI was successful.  There were no complications.  All the patient's wife's questions were answered.

## 2023-07-10 NOTE — Clinical Note
CATH EMERGE MR 20 X 3.00- The balloon was inserted into the guide and inflated used as a trap balloon. Balloon was removed.

## 2023-07-10 NOTE — Clinical Note
Per Jamaal WOO, okay for patient to be discharged at 1700 today. CSU vs SSCU bed placement, Miguel supervisor made aware of delay in transfer.

## 2023-07-10 NOTE — HOSPITAL COURSE
Patient tolerated procedure well. Sever coronary artery disease is present. See catheterization report for full details.  Patent LAD and diagonal stents.    midRCA with left and right collaterals.   Anterograde  wire crossing 3.0 x 32 SAMRA with zero residual stenosis.     Patient was able to ambulate post procedure and met all criteria for discharge. Access sites, clean dry and intact. Pulses palpable bilaterally.

## 2023-07-10 NOTE — INTERVAL H&P NOTE
The patient has been examined and the H&P has been reviewed:    I concur with the findings and no changes have occurred since H&P was written.  57 y.o. male with HCV/ cirrhosis, HTN, HLD, CAD and former smoker referred by Dr. Mohan who presents for [facility] and RCA  PCI. He presents with his wife this AM.   Patient states last episode of HUNTLEY was yesterday 7/9/23 while mowing the lawn.   He reported adherence to medications including ASA and plavix. Has been NPO since last night 8 PM.   Denies chest pain, HUNTLEY, palpitations this AM.     Procedure risks, benefits and alternative options discussed and understood by patient/family.          There are no hospital problems to display for this patient.

## 2023-07-10 NOTE — DISCHARGE SUMMARY
[facility] - Cardiology Stepdown  Interventional Cardiology  Discharge Summary      Patient Name: Orlin Zamarripa  MRN: [id number]  Admission Date: 7/10/2023  Hospital Length of Stay: 0 days  Discharge Date and Time:  07/10/2023 6:35 PM  Attending Physician: Jose Hinton MD  Discharging Provider: Jovan Ruffin MD  Primary Care Physician: Berna Hess, ZOE    HPI:  57 y.o. male with HCV/ cirrhosis, HTN, HLD, CAD and former smoker referred by Dr. Mohan who presents for LHC and RCA  PCI. He presents with his wife this AM. Patient states last episode of HUNTLEY was yesterday 7/9/23 while mowing the lawn.   He reported adherence to medications including ASA and plavix. Has been NPO since last night 8 PM.   Denies chest pain, HUNTLEY, palpitations this AM.       Procedure(s) (LRB):  Repair, Chronic Total Occlusion, Coronary (N/A)  IVUS, Coronary  Angiogram, Coronary, with Left Heart Cath  Stent, Drug Eluting, Single Vessel, Coronary     Indwelling Lines/Drains at time of discharge:  Lines/Drains/Airways       None                   Hospital Course:  Patient tolerated procedure well. Sever coronary artery disease is present. See catheterization report for full details.  Patent LAD and diagonal stents.    midRCA with left and right collaterals.   Anterograde  wire crossing 3.0 x 32 SAMRA with zero residual stenosis.     Patient was able to ambulate post procedure and met all criteria for discharge. Access sites, clean dry and intact. Pulses palpable bilaterally.            Pending Diagnostic Studies:       None          Cardiac/Vascular  Coronary artery disease of native artery of native heart with stable angina pectoris  Sever coronary artery disease is present. See catheterization report for full details.  Patent LAD and diagonal stents.    midRCA with left and right collaterals.   Anterograde  wire crossing 3.0 x 32 SAMRA with zero residual stenosis.   Pre procedure LVEDP 12.     - Cardiac rehab referral  -  Continue medical management,   - Follow-up with outpatient cardiologist  - Continue ASA and Plavix.         Discharged Condition: stable    Follow Up: [facility] [place] cardiology clinic in 2-4 weeks    Patient Instructions:      Cardiac rehab phase II   Standing Status: Future Standing Exp. Date: 07/10/24     Order Specific Question Answer Comments   Department [facility] CARDIAC REHAB    Select qualifying diagnosis: Z98.61 - Coronary angioplasty status      Medications:  Reconciled Home Medications:      Medication List        CONTINUE taking these medications      amLODIPine 5 MG tablet  Commonly known as: NORVASC  Take 5 mg by mouth once daily.     aspirin 81 MG EC tablet  Commonly known as: ECOTRIN  Take 81 mg by mouth once daily.     atorvastatin 20 MG tablet  Commonly known as: LIPITOR  Take 20 mg by mouth once daily.     clopidogreL 75 mg tablet  Commonly known as: PLAVIX  Take 4 tablets (300mg) this evening, followed by 1 tablet (75mg) thereafter.     ergocalciferol 50,000 unit Cap  Commonly known as: ERGOCALCIFEROL  Take 50,000 Units by mouth every 7 days.     metoprolol tartrate 25 MG tablet  Commonly known as: LOPRESSOR  Take 25 mg by mouth.     nitroGLYCERIN 0.4 MG SL tablet  Commonly known as: NITROSTAT  Place 1 tablet (0.4 mg total) under the tongue every 5 (five) minutes as needed for Chest pain.              Time spent on the discharge of patient: 35 minutes    Jovan Ruffin MD  Interventional Cardiology  [facility] - Cardiology Stepdown

## 2023-07-10 NOTE — NURSING
Pt is being discharged. Discharge teaching on follow up appointments, new meds to take and stop taking, follow up appointments, and when follow up with MD. Pt verbalized understanding. IV taken out and telemetry taken off, pt verbalized understanding. Pt stable at time of discharge.no signs or symptoms distress noted. pt discharge successful.

## 2023-07-10 NOTE — HPI
57 y.o. male with HCV/ cirrhosis, HTN, HLD, CAD and former smoker referred by Dr. Mohan who presents for LHC and RCA  PCI. He presents with his wife this AM. Patient states last episode of HUNTLEY was yesterday 7/9/23 while mowing the lawn.   He reported adherence to medications including ASA and plavix. Has been NPO since last night 8 PM.   Denies chest pain, HUNTLEY, palpitations this AM.

## 2023-07-10 NOTE — Clinical Note
The catheter was inserted into the distal   right coronary artery. IVUS was performed of the RCA. Catheter was removed.

## 2023-07-10 NOTE — BRIEF OP NOTE
Brief Operative Note:    : Jose Hinton MD     Referring Physician: Jose Hinton     All Operators: Surgeon(s):  MD Jose Oleary MD     Preoperative Diagnosis: CAD with exertional angina  Ischemic cardiomyopathy     Postop Diagnosis: s/p PCI    Treatments/Procedures: Procedure(s) (LRB):  Repair, Chronic Total Occlusion, Coronary (N/A)  IVUS, Coronary  Angiogram, Coronary, with Left Heart Cath  Stent, Drug Eluting, Single Vessel, Coronary    Findings: Sever coronary artery disease is present. See catheterization report for full details.  Patent LAD and diagonal stents.    midRCA with left and right collaterals.   Anterograde  wire crossing 3.0 x 32 SAMRA with zero residual stenosis.   Pre procedure LVEDP 12.       Estimated Blood loss: 20 cc    Specimens removed: No    Recommendations:   - Routine post-cath care as per orders  - IVF at 150 cc/hr for 4 hrs  - Cardiac rehab referral, Continue medical management, Risk factor reduction, Follow-up with outpatient cardiologist  - Continue ASA and Plavix.     Jovan Ruffin

## 2023-07-11 LAB
POC ACTIVATED CLOTTING TIME K: 257 SEC (ref 74–137)
POC ACTIVATED CLOTTING TIME K: 359 SEC (ref 74–137)
SAMPLE: ABNORMAL
SAMPLE: ABNORMAL

## 2023-08-03 ENCOUNTER — CLINICAL SUPPORT (OUTPATIENT)
Dept: ENDOSCOPY | Facility: HOSPITAL | Age: 58
End: 2023-08-03
Attending: INTERNAL MEDICINE
Payer: COMMERCIAL

## 2023-08-03 ENCOUNTER — TELEPHONE (OUTPATIENT)
Dept: ENDOSCOPY | Facility: HOSPITAL | Age: 58
End: 2023-08-03

## 2023-08-03 VITALS — WEIGHT: 175.06 LBS | HEIGHT: 67 IN | BODY MASS INDEX: 27.48 KG/M2

## 2023-08-03 DIAGNOSIS — Z87.19 HISTORY OF CIRRHOSIS OF LIVER: ICD-10-CM

## 2023-08-03 DIAGNOSIS — K74.60 HEPATIC CIRRHOSIS, UNSPECIFIED HEPATIC CIRRHOSIS TYPE, UNSPECIFIED WHETHER ASCITES PRESENT: Primary | ICD-10-CM

## 2023-08-03 NOTE — TELEPHONE ENCOUNTER
Spoke to patient wife to schedule procedure(s) Upper Endoscopy (EGD)       Physician to perform procedure(s) Dr. RAMIREZ Viramontes  Date of Procedure (s) 10/23/23  Arrival Time 10:30 AM  Time of Procedure(s) 11:30 AM   Location of Procedure(s) [address]  Type of Rx Prep sent to patient: N/A  Instructions provided to patient via [facility]    Patient was informed on the following information and verbalized understanding. Screening questionnaire reviewed with patient and complete. If procedure requires anesthesia, a responsible adult needs to be present to accompany the patient home, patient cannot drive after receiving anesthesia. Appointment details are tentative, especially check-in time. Patient will receive a prep-op call 4 days prior to confirm check-in time for procedure. If applicable the patient should contact their pharmacy to verify Rx for procedure prep is ready for pick-up. Patient was advised to call the scheduling department at [phone] if pharmacy states no Rx is available. Patient was advised to call the endoscopy scheduling department if any questions or concerns arise.      [facility] Scheduling Department

## 2023-08-03 NOTE — PLAN OF CARE
Spoke to patient wife to schedule procedure(s) Upper Endoscopy (EGD)       Physician to perform procedure(s) Dr. RAMIREZ Viramontes  Date of Procedure (s) 10/23/23  Arrival Time 10:30 AM  Time of Procedure(s) 11:30 AM   Location of Procedure(s) [address]  Type of Rx Prep sent to patient: N/A  Instructions provided to patient via [facility]    Patient was informed on the following information and verbalized understanding. Screening questionnaire reviewed with patient and complete. If procedure requires anesthesia, a responsible adult needs to be present to accompany the patient home, patient cannot drive after receiving anesthesia. Appointment details are tentative, especially check-in time. Patient will receive a prep-op call 4 days prior to confirm check-in time for procedure. If applicable the patient should contact their pharmacy to verify Rx for procedure prep is ready for pick-up. Patient was advised to call the scheduling department at [phone] if pharmacy states no Rx is available. Patient was advised to call the endoscopy scheduling department if any questions or concerns arise.      [facility] Scheduling Department

## 2023-08-10 ENCOUNTER — OFFICE VISIT (OUTPATIENT)
Dept: CARDIOLOGY | Facility: CLINIC | Age: 58
End: 2023-08-10
Payer: COMMERCIAL

## 2023-08-10 ENCOUNTER — TELEPHONE (OUTPATIENT)
Dept: ENDOSCOPY | Facility: HOSPITAL | Age: 58
End: 2023-08-10
Payer: COMMERCIAL

## 2023-08-10 VITALS
HEART RATE: 59 BPM | OXYGEN SATURATION: 98 % | BODY MASS INDEX: 27.64 KG/M2 | WEIGHT: 176.13 LBS | DIASTOLIC BLOOD PRESSURE: 70 MMHG | SYSTOLIC BLOOD PRESSURE: 108 MMHG | HEIGHT: 67 IN

## 2023-08-10 DIAGNOSIS — I10 PRIMARY HYPERTENSION: Primary | ICD-10-CM

## 2023-08-10 DIAGNOSIS — B19.20 HEPATITIS C VIRUS INFECTION WITHOUT HEPATIC COMA, UNSPECIFIED CHRONICITY: ICD-10-CM

## 2023-08-10 DIAGNOSIS — I25.10 CORONARY ARTERY DISEASE INVOLVING NATIVE CORONARY ARTERY OF NATIVE HEART WITHOUT ANGINA PECTORIS: ICD-10-CM

## 2023-08-10 DIAGNOSIS — K74.69 OTHER CIRRHOSIS OF LIVER: ICD-10-CM

## 2023-08-10 DIAGNOSIS — E78.2 MIXED HYPERLIPIDEMIA: ICD-10-CM

## 2023-08-10 DIAGNOSIS — D69.6 THROMBOCYTOPENIA: ICD-10-CM

## 2023-08-10 PROBLEM — I25.9 CHEST PAIN DUE TO MYOCARDIAL ISCHEMIA: Status: RESOLVED | Noted: 2023-03-01 | Resolved: 2023-08-10

## 2023-08-10 PROCEDURE — 3078F DIAST BP <80 MM HG: CPT | Mod: CPTII,S$GLB,, | Performed by: INTERNAL MEDICINE

## 2023-08-10 PROCEDURE — 3074F SYST BP LT 130 MM HG: CPT | Mod: CPTII,S$GLB,, | Performed by: INTERNAL MEDICINE

## 2023-08-10 PROCEDURE — 99999 PR PBB SHADOW E&M-EST. PATIENT-LVL IV: CPT | Mod: PBBFAC,,, | Performed by: INTERNAL MEDICINE

## 2023-08-10 PROCEDURE — 99999 PR PBB SHADOW E&M-EST. PATIENT-LVL IV: ICD-10-PCS | Mod: PBBFAC,,, | Performed by: INTERNAL MEDICINE

## 2023-08-10 PROCEDURE — 1159F PR MEDICATION LIST DOCUMENTED IN MEDICAL RECORD: ICD-10-PCS | Mod: CPTII,S$GLB,, | Performed by: INTERNAL MEDICINE

## 2023-08-10 PROCEDURE — 99214 OFFICE O/P EST MOD 30 MIN: CPT | Mod: S$GLB,,, | Performed by: INTERNAL MEDICINE

## 2023-08-10 PROCEDURE — 3074F PR MOST RECENT SYSTOLIC BLOOD PRESSURE < 130 MM HG: ICD-10-PCS | Mod: CPTII,S$GLB,, | Performed by: INTERNAL MEDICINE

## 2023-08-10 PROCEDURE — 99214 PR OFFICE/OUTPT VISIT, EST, LEVL IV, 30-39 MIN: ICD-10-PCS | Mod: S$GLB,,, | Performed by: INTERNAL MEDICINE

## 2023-08-10 PROCEDURE — 3008F PR BODY MASS INDEX (BMI) DOCUMENTED: ICD-10-PCS | Mod: CPTII,S$GLB,, | Performed by: INTERNAL MEDICINE

## 2023-08-10 PROCEDURE — 3008F BODY MASS INDEX DOCD: CPT | Mod: CPTII,S$GLB,, | Performed by: INTERNAL MEDICINE

## 2023-08-10 PROCEDURE — 3078F PR MOST RECENT DIASTOLIC BLOOD PRESSURE < 80 MM HG: ICD-10-PCS | Mod: CPTII,S$GLB,, | Performed by: INTERNAL MEDICINE

## 2023-08-10 PROCEDURE — 1159F MED LIST DOCD IN RCRD: CPT | Mod: CPTII,S$GLB,, | Performed by: INTERNAL MEDICINE

## 2023-08-10 NOTE — PROGRESS NOTES
Subjective:    Patient ID:  Orlin Zamarripa is a 57 y.o. male who presents for follow-up of Coronary Artery Disease      Referring physician: Dr. Mohan    HPI  Mr. Zamarripa is a 57 y.o. male with HTN, HLD, CAD and former smoker . He has been having exertional chest pain for approximately 1 year, that is worsened lately.  His chest pain is relieved with rest.  He had an angiogram performed at outside hospital, which revealed  to RCA, severe stenosis to mid LAD, and severe stenosis to distal left circumflex artery.  He was evaluated by Dr. Mohan for bypass surgery, however, he was deemed not to be a candidate given cirrhosis/HCV.  He underwent PCI of the LAD On 5/8/23. He underwent RCA  PCI on 7/10/23. Since then he denies any angina or HUNTLEY. He is now able to mow his entire lawn rapidly without having to stop. He denies lower extremity edema, PND, or  orthopnea. He denies palpitations, syncope, or near syncope.    Past Medical History:   Diagnosis Date    Hypertension     Mixed hyperlipidemia        Past Surgical History:   Procedure Laterality Date    ANGIOGRAM, CORONARY, WITH LEFT HEART CATHETERIZATION  7/10/2023    Procedure: Angiogram, Coronary, with Left Heart Cath;  Surgeon: Jose Hinton MD;  Location: [facility] CATH LAB;  Service: Cardiology;;    CARDIAC CATHETERIZATION  03/20/2023    CORONARY ANGIOGRAPHY Right 3/20/2023    Procedure: ANGIOGRAM, CORONARY ARTERY;  Surgeon: Esequiel Zamarripa MD;  Location: [facility] CATH LAB;  Service: Cardiology;  Laterality: Right;  radial    CORONARY ANGIOGRAPHY N/A 5/8/2023    Procedure: ANGIOGRAM, CORONARY ARTERY;  Surgeon: Jose Hinton MD;  Location: [facility] CATH LAB;  Service: Cardiology;  Laterality: N/A;    INSTANTANEOUS WAVE-FREE RATIO (IFR) N/A 5/8/2023    Procedure: Instantaneous Wave-Free Ratio (IFR);  Surgeon: Jose Hinton MD;  Location: [facility] CATH LAB;  Service: Cardiology;  Laterality: N/A;    IVUS, CORONARY  5/8/2023    Procedure: IVUS, Coronary;   Surgeon: Jose Hinton MD;  Location: [facility] CATH LAB;  Service: Cardiology;;    IVUS, CORONARY  7/10/2023    Procedure: IVUS, Coronary;  Surgeon: Jose Hinton MD;  Location: [facility] CATH LAB;  Service: Cardiology;;    LEFT HEART CATHETERIZATION Left 5/8/2023    Procedure: Left heart cath;  Surgeon: Jose Hinton MD;  Location: [facility] CATH LAB;  Service: Cardiology;  Laterality: Left;    REPAIR, CHRONIC TOTAL OCCLUSION, CORONARY N/A 7/10/2023    Procedure: Repair, Chronic Total Occlusion, Coronary;  Surgeon: Jose Hinton MD;  Location: [facility] CATH LAB;  Service: Cardiology;  Laterality: N/A;    STENT, DRUG ELUTING, MULTI VESSEL, CORONARY Left 5/8/2023    Procedure: Stent, Drug Eluting, Multi Vessel, Coronary;  Surgeon: Jose Hinton MD;  Location: [facility] CATH LAB;  Service: Cardiology;  Laterality: Left;  low bleeding risk 2.2%    STENT, DRUG ELUTING, SINGLE VESSEL, CORONARY  7/10/2023    Procedure: Stent, Drug Eluting, Single Vessel, Coronary;  Surgeon: Jose Hinton MD;  Location: [facility] CATH LAB;  Service: Cardiology;;       Current Outpatient Medications on File Prior to Visit   Medication Sig Dispense Refill    amLODIPine (NORVASC) 5 MG tablet Take 5 mg by mouth once daily.      aspirin (ECOTRIN) 81 MG EC tablet Take 81 mg by mouth once daily.      atorvastatin (LIPITOR) 20 MG tablet Take 20 mg by mouth once daily.      clopidogreL (PLAVIX) 75 mg tablet Take 4 tablets (300mg) this evening, followed by 1 tablet (75mg) thereafter. 30 tablet 11    metoprolol tartrate (LOPRESSOR) 25 MG tablet Take 25 mg by mouth.      ergocalciferol (ERGOCALCIFEROL) 50,000 unit Cap Take 50,000 Units by mouth every 7 days.      nitroGLYCERIN (NITROSTAT) 0.4 MG SL tablet Place 1 tablet (0.4 mg total) under the tongue every 5 (five) minutes as needed for Chest pain. (Patient not taking: Reported on 8/10/2023) 30 tablet 3     Current Facility-Administered Medications on File Prior to Visit   Medication  Dose Route Frequency Provider Last Rate Last Admin    0.9%  NaCl infusion   Intravenous Continuous Jose Hinton  mL/hr at 07/10/23 0708 New Bag at 07/10/23 0708    sodium chloride 0.9% flush 10 mL  10 mL Intravenous PRN Cristofer Hernandez MD           Review of patient's allergies indicates:  No Known Allergies    Social History     Tobacco Use    Smoking status: Former     Current packs/day: 0.00     Types: Cigarettes     Quit date: 4/20/2020     Years since quitting: 3.3    Smokeless tobacco: Never   Substance Use Topics    Alcohol use: Yes     Alcohol/week: 2.0 standard drinks of alcohol     Types: 1 Glasses of wine, 1 Cans of beer per week     Comment: rare    Drug use: Never       History reviewed. No pertinent family history.      Review of Systems   Constitutional: Negative for decreased appetite, diaphoresis, fever, malaise/fatigue, weight gain and weight loss.   HENT:  Negative for congestion, nosebleeds and sore throat.    Eyes:  Negative for blurred vision, vision loss in left eye, vision loss in right eye and visual disturbance.   Cardiovascular:  Negative for chest pain, claudication, dyspnea on exertion, leg swelling, near-syncope, orthopnea, palpitations, paroxysmal nocturnal dyspnea and syncope.   Respiratory:  Negative for cough, hemoptysis, shortness of breath and wheezing.    Endocrine: Negative for polyuria.   Hematologic/Lymphatic: Does not bruise/bleed easily.   Skin:  Negative for nail changes and rash.   Musculoskeletal:  Negative for back pain, muscle cramps and myalgias.   Gastrointestinal:  Negative for abdominal pain, change in bowel habit, diarrhea, heartburn, hematemesis, hematochezia, melena, nausea and vomiting.   Genitourinary:  Negative for bladder incontinence, dysuria, frequency and hematuria.   Psychiatric/Behavioral:  Negative for depression.    Allergic/Immunologic: Negative for hives.        Objective:     Vitals:    08/10/23 0943 08/10/23 0946   BP: 109/72 108/70  "  BP Location: Right arm Left arm   Patient Position: Sitting Sitting   BP Method: Large (Automatic) Large (Automatic)   Pulse: (!) 59 (!) 59   SpO2: 98%    Weight: 79.9 kg (176 lb 2.4 oz)    Height: 5' 7" (1.702 m)         Physical Exam  Constitutional:       Appearance: Normal appearance. He is well-developed.   HENT:      Head: Normocephalic and atraumatic.   Eyes:      Extraocular Movements: Extraocular movements intact.   Neck:      Thyroid: No thyromegaly.      Vascular: No JVD.   Cardiovascular:      Rate and Rhythm: Normal rate and regular rhythm.      Chest Wall: PMI is displaced.      Pulses:           Carotid pulses are 2+ on the right side and 2+ on the left side.       Radial pulses are 2+ on the right side and 2+ on the left side.        Femoral pulses are 2+ on the right side and 2+ on the left side.       Dorsalis pedis pulses are 2+ on the right side and 2+ on the left side.        Posterior tibial pulses are 2+ on the right side and 2+ on the left side.      Heart sounds: Normal heart sounds. No murmur heard.     No friction rub. No gallop.   Pulmonary:      Effort: Pulmonary effort is normal.      Breath sounds: Normal breath sounds. No wheezing, rhonchi or rales.   Abdominal:      General: Bowel sounds are normal. There is no distension.      Palpations: Abdomen is soft.      Tenderness: There is no abdominal tenderness.   Musculoskeletal:      Cervical back: Neck supple.   Skin:     General: Skin is warm and dry.      Findings: No erythema.   Neurological:      General: No focal deficit present.      Mental Status: He is alert and oriented to person, place, and time.      Gait: Gait normal.   Psychiatric:         Mood and Affect: Mood normal.         Behavior: Behavior normal.           Assessment:       1. Primary hypertension    2. Mixed hyperlipidemia    3. Coronary artery disease involving native coronary artery of native heart without angina pectoris    4. Thrombocytopenia    5. Hepatitis C " virus infection without hepatic coma, unspecified chronicity    6. Other cirrhosis of liver         Plan:       1) coronary artery disease.  The patient reports that is free of anginal and CHF symptoms.  -continue enteric-coated aspirin 81 mg p.o. q.d.  -continue Plavix 75 mg p.o. q.day  -rec starting aerobic exercise program with goal of 60 minutes, 5 days a week     2) hypertension.  The patient's blood pressure is adequately controlled in clinic today.  Continue Norvasc 5 mg p.o. q.day     3) dyslipidemia.  03/17/2023 lipid panel reviewed.  Continue Lipitor 20 mg p.o. q.day unless hepatology deems this should be stopped in light of the patient's liver disease     4) HCV cirrhosis.  Rec follow-up with hepatology      5) thrombocytopenia.  The patient's platelet count was 160 on 5/31/23; on 07/10/2023 8 was 123. this is likely secondary to his liver disease; recommend he follow up with hepatology     All of the patient's questions were answered.

## 2023-08-10 NOTE — TELEPHONE ENCOUNTER
Dear Dr Hinton,    Patient has a scheduled procedure Upper Endoscopy (EGD) on 10/23/23 and is currently taking a blood thinner prescribed by your office. In order to ensure patient safety, we would like to confirm that the patient can place their blood thinner medication on hold for the procedure. Can he/she discontinue Plavix (clopidogrel) for a minimum of 5 days prior to the procedure?     Thank you for your prompt reply.    [facility] Endoscopy Scheduling

## 2023-08-14 ENCOUNTER — TELEPHONE (OUTPATIENT)
Dept: ENDOSCOPY | Facility: HOSPITAL | Age: 58
End: 2023-08-14
Payer: COMMERCIAL

## 2023-08-14 NOTE — TELEPHONE ENCOUNTER
----- Message from Maritza Shaw RN sent at 8/3/2023  1:55 PM CDT -----  Regarding: 10/23 BT  The patient is currently under an internal cardiologist Dr. Jamaal jeff and requires a blood thinner Plavix (clopidogrel) for their upcoming scheduled Upper Endoscopy (EGD) on 10/23/23.

## 2023-09-06 ENCOUNTER — OFFICE VISIT (OUTPATIENT)
Dept: HEPATOLOGY | Facility: CLINIC | Age: 58
End: 2023-09-06
Payer: COMMERCIAL

## 2023-09-06 VITALS
SYSTOLIC BLOOD PRESSURE: 103 MMHG | HEIGHT: 67 IN | WEIGHT: 176.56 LBS | BODY MASS INDEX: 27.71 KG/M2 | DIASTOLIC BLOOD PRESSURE: 60 MMHG | RESPIRATION RATE: 19 BRPM | TEMPERATURE: 98 F | OXYGEN SATURATION: 98 % | HEART RATE: 60 BPM

## 2023-09-06 DIAGNOSIS — I25.10 CORONARY ARTERY DISEASE INVOLVING NATIVE CORONARY ARTERY OF NATIVE HEART WITHOUT ANGINA PECTORIS: ICD-10-CM

## 2023-09-06 DIAGNOSIS — K74.69 OTHER CIRRHOSIS OF LIVER: Primary | ICD-10-CM

## 2023-09-06 DIAGNOSIS — K76.6 PORTAL HYPERTENSION: ICD-10-CM

## 2023-09-06 DIAGNOSIS — B19.20 HEPATITIS C VIRUS INFECTION WITHOUT HEPATIC COMA, UNSPECIFIED CHRONICITY: ICD-10-CM

## 2023-09-06 DIAGNOSIS — D69.6 THROMBOCYTOPENIA: ICD-10-CM

## 2023-09-06 PROCEDURE — 3074F SYST BP LT 130 MM HG: CPT | Mod: CPTII,S$GLB,, | Performed by: INTERNAL MEDICINE

## 2023-09-06 PROCEDURE — 3008F BODY MASS INDEX DOCD: CPT | Mod: CPTII,S$GLB,, | Performed by: INTERNAL MEDICINE

## 2023-09-06 PROCEDURE — 99214 PR OFFICE/OUTPT VISIT, EST, LEVL IV, 30-39 MIN: ICD-10-PCS | Mod: S$GLB,,, | Performed by: INTERNAL MEDICINE

## 2023-09-06 PROCEDURE — 1160F RVW MEDS BY RX/DR IN RCRD: CPT | Mod: CPTII,S$GLB,, | Performed by: INTERNAL MEDICINE

## 2023-09-06 PROCEDURE — 3008F PR BODY MASS INDEX (BMI) DOCUMENTED: ICD-10-PCS | Mod: CPTII,S$GLB,, | Performed by: INTERNAL MEDICINE

## 2023-09-06 PROCEDURE — 1160F PR REVIEW ALL MEDS BY PRESCRIBER/CLIN PHARMACIST DOCUMENTED: ICD-10-PCS | Mod: CPTII,S$GLB,, | Performed by: INTERNAL MEDICINE

## 2023-09-06 PROCEDURE — 99999 PR PBB SHADOW E&M-EST. PATIENT-LVL IV: CPT | Mod: PBBFAC,,, | Performed by: INTERNAL MEDICINE

## 2023-09-06 PROCEDURE — 1159F MED LIST DOCD IN RCRD: CPT | Mod: CPTII,S$GLB,, | Performed by: INTERNAL MEDICINE

## 2023-09-06 PROCEDURE — 99214 OFFICE O/P EST MOD 30 MIN: CPT | Mod: S$GLB,,, | Performed by: INTERNAL MEDICINE

## 2023-09-06 PROCEDURE — 3078F DIAST BP <80 MM HG: CPT | Mod: CPTII,S$GLB,, | Performed by: INTERNAL MEDICINE

## 2023-09-06 PROCEDURE — 3078F PR MOST RECENT DIASTOLIC BLOOD PRESSURE < 80 MM HG: ICD-10-PCS | Mod: CPTII,S$GLB,, | Performed by: INTERNAL MEDICINE

## 2023-09-06 PROCEDURE — 1159F PR MEDICATION LIST DOCUMENTED IN MEDICAL RECORD: ICD-10-PCS | Mod: CPTII,S$GLB,, | Performed by: INTERNAL MEDICINE

## 2023-09-06 PROCEDURE — 99999 PR PBB SHADOW E&M-EST. PATIENT-LVL IV: ICD-10-PCS | Mod: PBBFAC,,, | Performed by: INTERNAL MEDICINE

## 2023-09-06 PROCEDURE — 3074F PR MOST RECENT SYSTOLIC BLOOD PRESSURE < 130 MM HG: ICD-10-PCS | Mod: CPTII,S$GLB,, | Performed by: INTERNAL MEDICINE

## 2023-09-06 NOTE — PROGRESS NOTES
HEPATOLOGY FOLLOW UP    Referring Physician:  Romy Vences,  Current Corresponding Physician:  Romy Vences, Berna Hess, SPP-C, Jose Hinton MD    Orlin Zamarripa is here for follow up of Cirrhosis      HPI  Orlin Zamarripa is a 57 y.o. male CAD on anticoagulation who presented 5/31/23 for evaluation of cirrhosis secondary to hepatitis C.     --In 2013: tx with peg/riba and telap for 10 months only- achieved SVR  --concern for possible HCC since nodule seen on abdo US in 2013-underwent CT scan:  TP CT 3/20/2013: no liver lesion seen; + portal htn  --since then patient denies any symptoms of decompensated cirrhosis, including no ascites or edema, cognitive problems that would suggest hepatic encephalopathy, or GI bleeding from varices.      CT chest abdo pelvis wo contrast 3/27/23: There is nodular contour liver, suggestive of cirrhosis.  No discrete hepatic lesion is identified.     Labs 3/17/23: ALT 39, AST 31, ALKP 61, Tbil 0.7, ptls 144  HCV RNA 3/12/2013: negative      MELD-Na: 6 at 5/31/2023  9:20 AM  MELD: 6 at 5/31/2023  9:20 AM  Calculated from:  Serum Creatinine: 0.9 mg/dL (Using min of 1 mg/dL) at 5/31/2023  9:20 AM  Serum Sodium: 141 mmol/L (Using max of 137 mmol/L) at 5/31/2023  9:20 AM  Total Bilirubin: 0.8 mg/dL (Using min of 1 mg/dL) at 5/31/2023  9:20 AM  INR(ratio): 1.0 at 5/31/2023  9:20 AM    Interval History  Since Orlin Zamarripa's last visit:    --dx w severe CAD- now s/p stent placement x 4 7/23    Serologic w/u: HCV Ab+, HCV RNA-, HBsAg-, HBcAb+, HBsAb+, HBeAg=, HBeAb-, HBV DNA-, HAV IgG+, KIKI-, ASMA+ (1:320), AMA-, IgG 1721, alpha 1 antitrypsin level normal, iron sat 42%, ferritin 708, peth <10    EGD: ND    MRI abdo w wo contrast 6/12/23: no liver cancer    MELD 3.0: 6 at 7/6/2023  9:56 AM  MELD-Na: 6 at 7/6/2023  9:56 AM  Calculated from:  Serum Creatinine: 1.0 mg/dL at 7/6/2023  9:56 AM  Serum Sodium: 139 mmol/L (Using max of 137 mmol/L) at 7/6/2023  9:56 AM  Total  Bilirubin: 0.7 mg/dL (Using min of 1 mg/dL) at 7/6/2023  9:56 AM  Serum Albumin: 4.2 g/dL (Using max of 3.5 g/dL) at 7/6/2023  9:56 AM  INR(ratio): 1.0 at 7/6/2023  9:56 AM  Age at listing (hypothetical): 57 years  Sex: Male at 7/6/2023  9:56 AM       Outpatient Encounter Medications as of 9/6/2023   Medication Sig Dispense Refill    amLODIPine (NORVASC) 5 MG tablet Take 5 mg by mouth once daily.      aspirin (ECOTRIN) 81 MG EC tablet Take 81 mg by mouth once daily.      atorvastatin (LIPITOR) 20 MG tablet Take 20 mg by mouth once daily.      clopidogreL (PLAVIX) 75 mg tablet Take 4 tablets (300mg) this evening, followed by 1 tablet (75mg) thereafter. 30 tablet 11    metoprolol tartrate (LOPRESSOR) 25 MG tablet Take 25 mg by mouth.      nitroGLYCERIN (NITROSTAT) 0.4 MG SL tablet Place 1 tablet (0.4 mg total) under the tongue every 5 (five) minutes as needed for Chest pain. 30 tablet 3    ergocalciferol (ERGOCALCIFEROL) 50,000 unit Cap Take 50,000 Units by mouth every 7 days.       Facility-Administered Encounter Medications as of 9/6/2023   Medication Dose Route Frequency Provider Last Rate Last Admin    0.9%  NaCl infusion   Intravenous Continuous Jose Hinton  mL/hr at 07/10/23 0708 New Bag at 07/10/23 0708    sodium chloride 0.9% flush 10 mL  10 mL Intravenous PRN Cristofer Hernandez MD         Review of patient's allergies indicates:  No Known Allergies  Past Medical History:   Diagnosis Date    Hypertension     Mixed hyperlipidemia        Review of Systems   Constitutional: Negative.    HENT: Negative.     Eyes: Negative.    Respiratory: Negative.     Cardiovascular: Negative.    Gastrointestinal: Negative.    Genitourinary: Negative.    Musculoskeletal: Negative.    Skin: Negative.    Neurological: Negative.    Psychiatric/Behavioral: Negative.       Vitals:    09/06/23 0859   BP: 103/60   Pulse: 60   Resp: 19   Temp: 98.2 °F (36.8 °C)       Physical Exam  Vitals reviewed.   Constitutional:        Appearance: He is well-developed.   HENT:      Head: Normocephalic and atraumatic.   Eyes:      General: No scleral icterus.     Conjunctiva/sclera: Conjunctivae normal.      Pupils: Pupils are equal, round, and reactive to light.   Neck:      Thyroid: No thyromegaly.   Cardiovascular:      Rate and Rhythm: Normal rate and regular rhythm.      Heart sounds: Normal heart sounds.   Pulmonary:      Effort: Pulmonary effort is normal.      Breath sounds: Normal breath sounds. No rales.   Abdominal:      General: Bowel sounds are normal. There is no distension.      Palpations: Abdomen is soft. There is no mass.      Tenderness: There is no abdominal tenderness.   Musculoskeletal:         General: Normal range of motion.      Cervical back: Normal range of motion and neck supple.   Skin:     General: Skin is warm and dry.      Findings: No rash.   Neurological:      Mental Status: He is alert and oriented to person, place, and time.         MELD 3.0: 6 at 7/6/2023  9:56 AM  MELD-Na: 6 at 7/6/2023  9:56 AM  Calculated from:  Serum Creatinine: 1.0 mg/dL at 7/6/2023  9:56 AM  Serum Sodium: 139 mmol/L (Using max of 137 mmol/L) at 7/6/2023  9:56 AM  Total Bilirubin: 0.7 mg/dL (Using min of 1 mg/dL) at 7/6/2023  9:56 AM  Serum Albumin: 4.2 g/dL (Using max of 3.5 g/dL) at 7/6/2023  9:56 AM  INR(ratio): 1.0 at 7/6/2023  9:56 AM  Age at listing (hypothetical): 57 years  Sex: Male at 7/6/2023  9:56 AM      Lab Results   Component Value Date     07/06/2023    BUN 19 07/06/2023    CREATININE 1.0 07/06/2023    CALCIUM 9.0 07/06/2023     07/06/2023    K 4.3 07/06/2023     07/06/2023    PROT 7.6 07/06/2023    CO2 25 07/06/2023    ANIONGAP 6 (L) 07/06/2023    WBC 3.50 (L) 07/10/2023    RBC 4.04 (L) 07/10/2023    HGB 13.2 (L) 07/10/2023    HCT 37.7 (L) 07/10/2023    MCV 93 07/10/2023    MCH 32.7 (H) 07/10/2023    MCHC 35.0 07/10/2023     Lab Results   Component Value Date    RDW 12.2 07/10/2023     (L)  07/10/2023    MPV 9.6 07/10/2023    GRAN 1.7 (L) 07/06/2023    GRAN 54.1 07/06/2023    LYMPH 1.1 07/06/2023    LYMPH 35.0 07/06/2023    MONO 0.3 07/06/2023    MONO 8.4 07/06/2023    EOSINOPHIL 1.9 07/06/2023    BASOPHIL 0.3 07/06/2023    EOS 0.1 07/06/2023    BASO 0.01 07/06/2023    GROUPTRH AB POS 07/10/2023    CHOL 104 (L) 03/17/2023    TRIG 75 03/17/2023    HDL 35 (L) 03/17/2023    CHOLHDL 33.7 03/17/2023    TOTALCHOLEST 3.0 03/17/2023    ALBUMIN 4.2 07/06/2023    BILIDIR 0.4 (H) 05/31/2023    AST 30 07/06/2023    ALT 37 07/06/2023    ALKPHOS 53 (L) 07/06/2023    LABPROT 10.9 07/06/2023    INR 1.0 07/06/2023       Assessment and Plan:  Orlin Zamarripa is a 57 y.o. male with cirrhosis likely from HCV, now cured. Current recommendations:  Cirrhosis, MELD <15:Serologic w/u for CLD shows very +ASMA and elevated IgG. Should liver enzymes increase will consider liver biopsy to r/o AIH; repeat meld labs every 3 months  Portal htn and thrombocytopenia: EGD to screen for varices- it is important to know if pt has varices since is on DAPT and will be on this for at least a year. I advocate proceeding with EGD while on DAPT  Liver lesion hx: none on ct or MRI abdo; recommend repeat surveillance for HCC with abdo US every 6 mnths- next due 12/23    Return 6 motnhs

## 2023-09-06 NOTE — Clinical Note
Pt with cirrhosis and just had 4 coronary stents placed. Important to know if has varices. I advocate doing EGD on AC. Can this be arranged?

## 2023-09-07 ENCOUNTER — TELEPHONE (OUTPATIENT)
Dept: ENDOSCOPY | Facility: HOSPITAL | Age: 58
End: 2023-09-07
Payer: COMMERCIAL

## 2023-09-07 ENCOUNTER — PATIENT MESSAGE (OUTPATIENT)
Dept: ENDOSCOPY | Facility: HOSPITAL | Age: 58
End: 2023-09-07
Payer: COMMERCIAL

## 2023-09-07 NOTE — TELEPHONE ENCOUNTER
Spoke with pt's wife, Olga, and instructed her regarding pt NOT holding his Plavix prior to EGD on 10/23/23 per Dr. Avitia as below.  Olga verbalized understanding.  Updated instructions sent via patient portal.              Mann Viramontes MD Sierra, Diana, RN  On plavix is fine with me.             ----- Message -----   From: Celina Negrete RN   Sent: 9/7/2023   8:59 AM CDT   To: Mann Viramontes MD     Good morning,     Please see below communication between Dr. Lynn and Dr. TANNER Garcia. This pt is scheduled with you for EGD on 10/23/23.  Cardiology advised anticoagulation may be held for an urgent case with aspirin being continued, however recommends postponing procedure for 3 months if not urgent due to recent cardiac stents.    Do you want to do the procedure on or off Plavix?  Please advise.     Thank you,   Celina

## 2023-10-23 ENCOUNTER — TELEPHONE (OUTPATIENT)
Dept: ENDOSCOPY | Facility: HOSPITAL | Age: 58
End: 2023-10-23
Payer: COMMERCIAL

## 2023-10-23 ENCOUNTER — HOSPITAL ENCOUNTER (OUTPATIENT)
Facility: HOSPITAL | Age: 58
Discharge: HOME | End: 2023-10-23
Attending: INTERNAL MEDICINE | Admitting: INTERNAL MEDICINE
Payer: COMMERCIAL

## 2023-10-23 DIAGNOSIS — Z87.19 HISTORY OF GASTROINTESTINAL DISEASE: Primary | ICD-10-CM

## 2023-10-23 DIAGNOSIS — Z87.19 PERSONAL HISTORY OF DIGESTIVE SYSTEM DISEASE: Primary | ICD-10-CM

## 2023-10-23 DIAGNOSIS — B19.20 HEPATITIS C VIRUS INFECTION WITHOUT HEPATIC COMA, UNSPECIFIED CHRONICITY: ICD-10-CM

## 2023-10-23 DIAGNOSIS — K74.69 OTHER CIRRHOSIS OF LIVER: Primary | ICD-10-CM

## 2023-10-23 DIAGNOSIS — I85.00 VARICES, ESOPHAGEAL: ICD-10-CM

## 2023-10-23 NOTE — CARE UPDATE
Procedure cancelled due to pt unable to come off of blood thinners at this time. Cancelled per Anesthesia MD and GI MD. Will put with schedulers

## 2023-10-23 NOTE — H&P
Addendum:     After discussing with Anesthesia, patient has a history of CAD and had PCI in July.  Feel that the periprocedural risks of endoscopy within 6 mo of stent placement outweighs benefits.  We will plan to reschedule patient for EGD for variceal screening anytime past Feb 1 2024.            Short Stay Endoscopy   Pre-Procedure Note    PCP - Berna Lazcano FNP-C  Referring Physician - Hyacinth Lynn MD  [address] [place] [address]    Procedure - Endoscopy    ASA - per anesthesia  Mallampati - per anesthesia    HPI  57 y.o. male scheduled for endoscopy for evaluation of    1. Other cirrhosis of liver    2. Hepatitis C virus infection without hepatic coma, unspecified chronicity    3. Varices, esophageal         Medical History:  has a past medical history of Hypertension, Mixed hyperlipidemia, and Portal hypertension (9/6/2023).    Surgical History:  has a past surgical history that includes Cardiac catheterization (03/20/2023); Coronary angiography (Right, 3/20/2023); stent, drug eluting, multi vessel, coronary (Left, 5/8/2023); ivus, coronary (5/8/2023); Left heart catheterization (Left, 5/8/2023); Coronary angiography (N/A, 5/8/2023); instantaneous wave-free ratio (ifr) (N/A, 5/8/2023); repair, chronic total occlusion, coronary (N/A, 7/10/2023); ivus, coronary (7/10/2023); ANGIOGRAM, CORONARY, WITH LEFT HEART CATHETERIZATION (7/10/2023); and stent, drug eluting, single vessel, coronary (7/10/2023).    Family History: family history is not on file..    Social History:  reports that he quit smoking about 3 years ago. His smoking use included cigarettes. He has never used smokeless tobacco. He reports current alcohol use of about 2.0 standard drinks of alcohol per week. He reports that he does not use drugs.    Review of patient's allergies indicates:  No Known Allergies    Medications:   Facility-Administered Medications Prior to Admission   Medication Dose Route Frequency Provider Last  Rate Last Admin    sodium chloride 0.9% flush 10 mL  10 mL Intravenous PRN Cristofer Hernandez MD         Medications Prior to Admission   Medication Sig Dispense Refill Last Dose    amLODIPine (NORVASC) 5 MG tablet Take 5 mg by mouth once daily.       aspirin (ECOTRIN) 81 MG EC tablet Take 81 mg by mouth once daily.       atorvastatin (LIPITOR) 20 MG tablet Take 20 mg by mouth once daily.       clopidogreL (PLAVIX) 75 mg tablet Take 4 tablets (300mg) this evening, followed by 1 tablet (75mg) thereafter. 30 tablet 11     ergocalciferol (ERGOCALCIFEROL) 50,000 unit Cap Take 50,000 Units by mouth every 7 days.       metoprolol tartrate (LOPRESSOR) 25 MG tablet Take 25 mg by mouth.       nitroGLYCERIN (NITROSTAT) 0.4 MG SL tablet Place 1 tablet (0.4 mg total) under the tongue every 5 (five) minutes as needed for Chest pain. 30 tablet 3          Labs:  Lab Results   Component Value Date    WBC 3.50 (L) 07/10/2023    HGB 13.2 (L) 07/10/2023    HCT 37.7 (L) 07/10/2023     (L) 07/10/2023    CHOL 104 (L) 03/17/2023    TRIG 75 03/17/2023    HDL 35 (L) 03/17/2023    ALT 37 07/06/2023    AST 30 07/06/2023     07/06/2023    K 4.3 07/06/2023     07/06/2023    CREATININE 1.0 07/06/2023    BUN 19 07/06/2023    CO2 25 07/06/2023    INR 1.0 07/06/2023       Risks and benefits of this endoscopic procedure, including but not limited to bleeding, inflammation, infection, perforation, and death, explained to the patient prior to procedure      Mann Viramontes MD

## 2023-10-23 NOTE — TELEPHONE ENCOUNTER
Spoke to patient to schedule procedure(s) Upper Endoscopy (EGD)       Physician to perform procedure(s) Dr. RAMIREZ Ledbetter   Date of Procedure (s) 2/5/24  Arrival Time 11:30 AM  Time of Procedure(s) 12:30 PM   Location of Procedure(s) [address]  Type of Rx Prep sent to patient: N/A  Instructions provided to patient via [facility]    Patient was informed on the following information and verbalized understanding. Screening questionnaire reviewed with patient and complete. If procedure requires anesthesia, a responsible adult needs to be present to accompany the patient home, patient cannot drive after receiving anesthesia. Appointment details are tentative, especially check-in time. Patient will receive a prep-op call 4 days prior to confirm check-in time for procedure. If applicable the patient should contact their pharmacy to verify Rx for procedure prep is ready for pick-up. Patient was advised to call the scheduling department at [phone] if pharmacy states no Rx is available. Patient was advised to call the endoscopy scheduling department if any questions or concerns arise.      [facility] Endoscopy Scheduling Department

## 2023-10-24 ENCOUNTER — TELEPHONE (OUTPATIENT)
Dept: ENDOSCOPY | Facility: HOSPITAL | Age: 58
End: 2023-10-24
Payer: COMMERCIAL

## 2023-10-24 NOTE — TELEPHONE ENCOUNTER
Dear Dr. Hinton,    Patient has a rescheduled procedure Upper Endoscopy (EGD) on 2/5/24 and is currently taking a blood thinner prescribed by your office. In order to ensure patient safety, we would like to confirm that the patient can place their blood thinner medication on hold for the procedure. Can he/she discontinue Plavix (clopidogrel) for a minimum of 5 days prior to the procedure?     Thank you for your prompt reply.    [facility] Endoscopy Scheduling

## 2023-10-24 NOTE — TELEPHONE ENCOUNTER
----- Message from Ange Bowen MA sent at 10/23/2023 12:20 PM CDT -----  Regarding: BT 2/5  The patient is currently under an internal cardiologist Dr. Jamaal jeff and requires a blood thinner Plavix (clopidogrel) for their upcoming scheduled Upper Endoscopy (EGD) on 2/5/24.

## 2023-10-31 ENCOUNTER — TELEPHONE (OUTPATIENT)
Dept: HEPATOLOGY | Facility: CLINIC | Age: 58
End: 2023-10-31
Payer: COMMERCIAL

## 2023-10-31 NOTE — TELEPHONE ENCOUNTER
----- Message from Hyacinth Lynn MD -----  Please call pt and let him know he has cirrhosis and needs to stop alcohol.      Call placed to the patient at [phone]. Message relayed from Dr Lynn.  Patient stated wait. Phone was handed to lady who said she was his wife.    Message was relayed from Dr Lynn.  Olga stated he doesn't drink. He only a beer every now and then.  Both voiced understanding.

## 2023-10-31 NOTE — TELEPHONE ENCOUNTER
Contacted patient-spoke to wife Olga.  Informed her of the recommendation from Dr Hinton to continue talking aspirin while off of the Plavix.  Stated understanding.

## 2023-10-31 NOTE — TELEPHONE ENCOUNTER
----- Message from Hyacinth Lynn MD sent at 10/31/2023 12:28 PM CDT -----  Please call pt and let him know he has cirrhosis and needs to stop alcohol

## 2023-12-06 ENCOUNTER — HOSPITAL ENCOUNTER (OUTPATIENT)
Dept: RADIOLOGY | Facility: HOSPITAL | Age: 58
Discharge: HOME | End: 2023-12-06
Attending: INTERNAL MEDICINE
Payer: COMMERCIAL

## 2023-12-06 DIAGNOSIS — K74.69 OTHER CIRRHOSIS OF LIVER: ICD-10-CM

## 2023-12-06 PROCEDURE — 76700 US EXAM ABDOM COMPLETE: CPT | Mod: TC,PO

## 2024-02-05 ENCOUNTER — ANESTHESIA EVENT (OUTPATIENT)
Dept: ENDOSCOPY | Facility: HOSPITAL | Age: 59
End: 2024-02-05
Payer: COMMERCIAL

## 2024-02-05 ENCOUNTER — TELEPHONE (OUTPATIENT)
Dept: CARDIOLOGY | Facility: CLINIC | Age: 59
End: 2024-02-05
Payer: COMMERCIAL

## 2024-02-05 ENCOUNTER — HOSPITAL ENCOUNTER (OUTPATIENT)
Facility: HOSPITAL | Age: 59
Discharge: HOME | End: 2024-02-05
Attending: INTERNAL MEDICINE | Admitting: INTERNAL MEDICINE
Payer: COMMERCIAL

## 2024-02-05 ENCOUNTER — ANESTHESIA (OUTPATIENT)
Dept: ENDOSCOPY | Facility: HOSPITAL | Age: 59
End: 2024-02-05
Payer: COMMERCIAL

## 2024-02-05 VITALS
RESPIRATION RATE: 14 BRPM | TEMPERATURE: 97 F | BODY MASS INDEX: 25.11 KG/M2 | SYSTOLIC BLOOD PRESSURE: 140 MMHG | OXYGEN SATURATION: 99 % | WEIGHT: 160 LBS | HEART RATE: 74 BPM | HEIGHT: 67 IN | DIASTOLIC BLOOD PRESSURE: 78 MMHG

## 2024-02-05 DIAGNOSIS — K74.60 CIRRHOSIS: ICD-10-CM

## 2024-02-05 DIAGNOSIS — K74.69 OTHER CIRRHOSIS OF LIVER: Primary | ICD-10-CM

## 2024-02-05 PROCEDURE — 25000003 PHARM REV CODE 250: Performed by: NURSE ANESTHETIST, CERTIFIED REGISTERED

## 2024-02-05 PROCEDURE — 88305 TISSUE EXAM BY PATHOLOGIST: CPT | Performed by: STUDENT IN AN ORGANIZED HEALTH CARE EDUCATION/TRAINING PROGRAM

## 2024-02-05 PROCEDURE — 88342 IMHCHEM/IMCYTCHM 1ST ANTB: CPT | Performed by: STUDENT IN AN ORGANIZED HEALTH CARE EDUCATION/TRAINING PROGRAM

## 2024-02-05 PROCEDURE — E9220 PRA ENDO ANESTHESIA: HCPCS | Mod: ,,, | Performed by: NURSE ANESTHETIST, CERTIFIED REGISTERED

## 2024-02-05 PROCEDURE — 88305 TISSUE EXAM BY PATHOLOGIST: CPT | Mod: 26,,, | Performed by: STUDENT IN AN ORGANIZED HEALTH CARE EDUCATION/TRAINING PROGRAM

## 2024-02-05 PROCEDURE — 43239 EGD BIOPSY SINGLE/MULTIPLE: CPT | Performed by: INTERNAL MEDICINE

## 2024-02-05 PROCEDURE — 37000008 HC ANESTHESIA 1ST 15 MINUTES: Performed by: INTERNAL MEDICINE

## 2024-02-05 PROCEDURE — 43239 EGD BIOPSY SINGLE/MULTIPLE: CPT | Mod: ,,, | Performed by: INTERNAL MEDICINE

## 2024-02-05 PROCEDURE — 88342 IMHCHEM/IMCYTCHM 1ST ANTB: CPT | Mod: 26,,, | Performed by: STUDENT IN AN ORGANIZED HEALTH CARE EDUCATION/TRAINING PROGRAM

## 2024-02-05 PROCEDURE — 27201012 HC FORCEPS, HOT/COLD, DISP: Performed by: INTERNAL MEDICINE

## 2024-02-05 PROCEDURE — 25000003 PHARM REV CODE 250: Performed by: INTERNAL MEDICINE

## 2024-02-05 RX ORDER — SODIUM CHLORIDE 9 MG/ML
INJECTION, SOLUTION INTRAVENOUS CONTINUOUS
Status: CANCELLED | OUTPATIENT
Start: 2024-02-05

## 2024-02-05 RX ORDER — SODIUM CHLORIDE 9 MG/ML
INJECTION, SOLUTION INTRAVENOUS CONTINUOUS
Status: DISCONTINUED | OUTPATIENT
Start: 2024-02-05 | End: 2024-02-05 | Stop reason: HOSPADM

## 2024-02-05 RX ORDER — LIDOCAINE HYDROCHLORIDE 20 MG/ML
INJECTION INTRAVENOUS
Status: DISCONTINUED | OUTPATIENT
Start: 2024-02-05 | End: 2024-02-05

## 2024-02-05 RX ORDER — PROPOFOL 10 MG/ML
VIAL (ML) INTRAVENOUS
Status: DISCONTINUED | OUTPATIENT
Start: 2024-02-05 | End: 2024-02-05

## 2024-02-05 RX ADMIN — SODIUM CHLORIDE: 0.9 INJECTION, SOLUTION INTRAVENOUS at 12:02

## 2024-02-05 RX ADMIN — LIDOCAINE HYDROCHLORIDE 50 MG: 20 INJECTION INTRAVENOUS at 12:02

## 2024-02-05 RX ADMIN — Medication 20 MG: at 12:02

## 2024-02-05 RX ADMIN — Medication 80 MG: at 12:02

## 2024-02-05 NOTE — H&P
Short Stay Endoscopy History and Physical    PCP - Berna Lazcano FNP-C     Procedure - EGD  ASA - per anesthesia  Mallampati - per anesthesia  History of Anesthesia problems - no  Family history Anesthesia problems -  no   Plan of anesthesia - General    HPI:  This is a 58 y.o. male here for evaluation of : variceal surveillance        ROS:  Constitutional: No fevers, chills, No weight loss  CV: No chest pain  Pulm: No cough, No shortness of breath  Ophtho: No vision changes  GI: see HPI  Derm: No rash    Medical History:  has a past medical history of Hypertension, Mixed hyperlipidemia, and Portal hypertension (9/6/2023).    Surgical History:  has a past surgical history that includes Cardiac catheterization (03/20/2023); Coronary angiography (Right, 3/20/2023); stent, drug eluting, multi vessel, coronary (Left, 5/8/2023); ivus, coronary (5/8/2023); Left heart catheterization (Left, 5/8/2023); Coronary angiography (N/A, 5/8/2023); instantaneous wave-free ratio (ifr) (N/A, 5/8/2023); repair, chronic total occlusion, coronary (N/A, 7/10/2023); ivus, coronary (7/10/2023); ANGIOGRAM, CORONARY, WITH LEFT HEART CATHETERIZATION (7/10/2023); and stent, drug eluting, single vessel, coronary (7/10/2023).    Family History: family history is not on file.. Otherwise no colon cancer, inflammatory bowel disease, or GI malignancies.    Social History:  reports that he quit smoking about 3 years ago. His smoking use included cigarettes. He has never used smokeless tobacco. He reports current alcohol use of about 2.0 standard drinks of alcohol per week. He reports that he does not use drugs.    Review of patient's allergies indicates:  No Known Allergies    Medications:   Facility-Administered Medications Prior to Admission   Medication Dose Route Frequency Provider Last Rate Last Admin    sodium chloride 0.9% flush 10 mL  10 mL Intravenous PRCristofer Solorio MD         Medications Prior to Admission   Medication Sig  Dispense Refill Last Dose    amLODIPine (NORVASC) 5 MG tablet Take 5 mg by mouth once daily.       aspirin (ECOTRIN) 81 MG EC tablet Take 81 mg by mouth once daily.       atorvastatin (LIPITOR) 20 MG tablet Take 20 mg by mouth once daily.       clopidogreL (PLAVIX) 75 mg tablet Take 4 tablets (300mg) this evening, followed by 1 tablet (75mg) thereafter. 30 tablet 11     ergocalciferol (ERGOCALCIFEROL) 50,000 unit Cap Take 50,000 Units by mouth every 7 days.       metoprolol tartrate (LOPRESSOR) 25 MG tablet Take 25 mg by mouth.       nitroGLYCERIN (NITROSTAT) 0.4 MG SL tablet Place 1 tablet (0.4 mg total) under the tongue every 5 (five) minutes as needed for Chest pain. 30 tablet 3        Physical Exam:    Vital Signs: There were no vitals filed for this visit.    Gen: NAD, lying comfortably  HENT: NCAT, oropharynx clear  Eyes: anicteric sclerae, EOMI grossly  Neck: supple, no visible masses/goiter  Cardiac: RRR  Lungs: non-labored breathing  Abd: soft, NT/ND, normoactive BS  Ext: no LE edema, warm, well perfused  Skin: skin intact on exposed body parts, no visible rashes, lesions  Neuro: A&Ox4, neuro exam grossly intact, moves all extremities  Psych: appropriate mood, affect      Labs:  Lab Results   Component Value Date    WBC 3.53 (L) 12/06/2023    HGB 14.3 12/06/2023    HCT 41.7 12/06/2023     (L) 12/06/2023    CHOL 104 (L) 03/17/2023    TRIG 75 03/17/2023    HDL 35 (L) 03/17/2023    ALT 33 12/06/2023    AST 28 12/06/2023     12/06/2023    K 4.6 12/06/2023     12/06/2023    CREATININE 1.0 12/06/2023    BUN 18 12/06/2023    CO2 30 (H) 12/06/2023    INR 1.0 12/06/2023       Plan:  EGD for variceal surveillance.    I have explained the risks and benefits of endoscopy procedures to the patient including but not limited to bleeding, perforation, infection, and death.  The patient was asked if they understand and allowed to ask any further questions to their satisfaction.      Ivanna Ledbetter,  MD

## 2024-02-05 NOTE — TELEPHONE ENCOUNTER
Request for follow up appointment.  Assisted to complete.  Mailed appt letter to patient residence.

## 2024-02-05 NOTE — ANESTHESIA PREPROCEDURE EVALUATION
02/05/2024  Orlin Zamarripa is a 58 y.o., male.    Past Medical History:   Diagnosis Date    Hypertension     Mixed hyperlipidemia     Portal hypertension 9/6/2023     Past Surgical History:   Procedure Laterality Date    ANGIOGRAM, CORONARY, WITH LEFT HEART CATHETERIZATION  7/10/2023    Procedure: Angiogram, Coronary, with Left Heart Cath;  Surgeon: Jose Hinton MD;  Location: [facility] CATH LAB;  Service: Cardiology;;    CARDIAC CATHETERIZATION  03/20/2023    CORONARY ANGIOGRAPHY Right 3/20/2023    Procedure: ANGIOGRAM, CORONARY ARTERY;  Surgeon: Esequiel Zamarripa MD;  Location: [facility] CATH LAB;  Service: Cardiology;  Laterality: Right;  radial    CORONARY ANGIOGRAPHY N/A 5/8/2023    Procedure: ANGIOGRAM, CORONARY ARTERY;  Surgeon: Jose Hinton MD;  Location: [facility] CATH LAB;  Service: Cardiology;  Laterality: N/A;    INSTANTANEOUS WAVE-FREE RATIO (IFR) N/A 5/8/2023    Procedure: Instantaneous Wave-Free Ratio (IFR);  Surgeon: Jose Hinton MD;  Location: [facility] CATH LAB;  Service: Cardiology;  Laterality: N/A;    IVUS, CORONARY  5/8/2023    Procedure: IVUS, Coronary;  Surgeon: Jose Hinton MD;  Location: [facility] CATH LAB;  Service: Cardiology;;    IVUS, CORONARY  7/10/2023    Procedure: IVUS, Coronary;  Surgeon: Jose Hinton MD;  Location: [facility] CATH LAB;  Service: Cardiology;;    LEFT HEART CATHETERIZATION Left 5/8/2023    Procedure: Left heart cath;  Surgeon: Jose Hinton MD;  Location: [facility] CATH LAB;  Service: Cardiology;  Laterality: Left;    REPAIR, CHRONIC TOTAL OCCLUSION, CORONARY N/A 7/10/2023    Procedure: Repair, Chronic Total Occlusion, Coronary;  Surgeon: Jose Hinton MD;  Location: [facility] CATH LAB;  Service: Cardiology;  Laterality: N/A;    STENT, DRUG ELUTING, MULTI VESSEL, CORONARY Left 5/8/2023    Procedure: Stent, Drug Eluting, Multi Vessel, Coronary;   Surgeon: Jose Hinton MD;  Location: [facility] CATH LAB;  Service: Cardiology;  Laterality: Left;  low bleeding risk 2.2%    STENT, DRUG ELUTING, SINGLE VESSEL, CORONARY  7/10/2023    Procedure: Stent, Drug Eluting, Single Vessel, Coronary;  Surgeon: Jose Hinton MD;  Location: [facility] CATH LAB;  Service: Cardiology;;     Patient Active Problem List   Diagnosis    Primary hypertension    Mixed hyperlipidemia    Ex-smoker    Abnormal EKG    Coronary artery disease involving native coronary artery of native heart without angina pectoris    Cirrhosis    Hepatitis C    Thrombocytopenia    Portal hypertension           Pre-op Assessment    I have reviewed the Patient Summary Reports.     I have reviewed the Nursing Notes. I have reviewed the NPO Status.   I have reviewed the Medications.     Review of Systems  Anesthesia Hx:  No problems with previous Anesthesia             Denies Family Hx of Anesthesia complications.    Denies Personal Hx of Anesthesia complications.                    Hematology/Oncology:  Hematology Normal                                     Cardiovascular:     Hypertension   CAD                                        Hepatic/GI:  Bowel Prep.    Liver Disease, Hepatitis, C           Musculoskeletal:  Musculoskeletal Normal                Neurological:  Neurology Normal                                      Dermatological:  Skin Normal        Physical Exam  General: Cooperative, Alert and Oriented    Airway:  Mallampati: II   Mouth Opening: Normal  TM Distance: Normal  Tongue: Normal  Neck ROM: Normal ROM    Dental:  Intact        Anesthesia Plan  Type of Anesthesia, risks & benefits discussed:    Anesthesia Type: Gen Natural Airway  Intra-op Monitoring Plan: Standard ASA Monitors  Induction:  IV  Informed Consent: Informed consent signed with the Patient and all parties understand the risks and agree with anesthesia plan.  All questions answered.   ASA Score: 3  Day of Surgery Review of  History & Physical: H&P Update referred to the surgeon/provider.I have interviewed and examined the patient. I have reviewed the patient's H&P dated: There are no significant changes.     Ready For Surgery From Anesthesia Perspective.     .

## 2024-02-05 NOTE — [employer]ATION PATIENT INSTRUCTIONS
Discharge Summary/Instructions after an Endoscopic Procedure  Patient Name: Orlin Zamarripa  Patient MRN: [id number]  Patient YOB: 1965 Monday, February 5, 2024  Ivanna Ledbetter MD  Dear patient,  As a result of recent federal legislation (The Federal Cures Act), you may   receive lab or pathology results from your procedure in your [facility]   account before your physician is able to contact you. Your physician or   their representative will relay the results to you with their   recommendations at their soonest availability.  Thank you,  RESTRICTIONS:  During your procedure today, you received medications for sedation.  These   medications may affect your judgment, balance and coordination.  Therefore,   for 24 hours, you have the following restrictions:   - DO NOT drive a car, operate machinery, make legal/financial decisions,   sign important papers or drink alcohol.    ACTIVITY:  Today: no heavy lifting, straining or running due to procedural   sedation/anesthesia.  The following day: return to full activity including work.  DIET:  Eat and drink normally unless instructed otherwise.     TREATMENT FOR COMMON SIDE EFFECTS:  - Mild abdominal pain, nausea, belching, bloating or excessive gas:  rest,   eat lightly and use a heating pad.  - Sore Throat: treat with throat lozenges and/or gargle with warm salt   water.  - Because air was used during the procedure, expelling large amounts of air   from your rectum or belching is normal.  - If a bowel prep was taken, you may not have a bowel movement for 1-3 days.    This is normal.  SYMPTOMS TO WATCH FOR AND REPORT TO YOUR PHYSICIAN:  1. Abdominal pain or bloating, other than gas cramps.  2. Chest pain.  3. Back pain.  4. Signs of infection such as: chills or fever occurring within 24 hours   after the procedure.  5. Rectal bleeding, which would show as bright red, maroon, or black stools.   (A tablespoon of blood from the rectum is not serious, especially if    hemorrhoids are present.)  6. Vomiting.  7. Weakness or dizziness.  GO DIRECTLY TO THE NEAREST EMERGENCY ROOM IF YOU HAVE ANY OF THE FOLLOWING:      Difficulty breathing              Chills and/or fever over 101 F   Persistent vomiting and/or vomiting blood   Severe abdominal pain   Severe chest pain   Black, tarry stools   Bleeding- more than one tablespoon   Any other symptom or condition that you feel may need urgent attention  Your doctor recommends these additional instructions:  If any biopsies were taken, your doctors clinic will contact you in 1 to 2   weeks with any results.  - Discharge patient to home.   - Resume previous diet.   - Continue present medications.   - Await pathology results.  For questions, problems or results please call your physician - Ivanna Ledbetter MD at Work:  [phone].  [facility] [place], EMERGENCY ROOM PHONE NUMBER: [phone]  IF A COMPLICATION OR EMERGENCY SITUATION ARISES AND YOU ARE UNABLE TO REACH   YOUR PHYSICIAN - GO DIRECTLY TO THE EMERGENCY ROOM.  Ivanna Ledbetter MD  2/5/2024 12:20:33 PM  This report has been verified and signed electronically.  Dear patient,  As a result of recent federal legislation (The Federal Cures Act), you may   receive lab or pathology results from your procedure in your [facility]   account before your physician is able to contact you. Your physician or   their representative will relay the results to you with their   recommendations at their soonest availability.  Thank you,  PROVATION

## 2024-02-05 NOTE — TRANSFER OF CARE
"Anesthesia Transfer of Care Note    Patient: Orlin Zamarripa    Procedure(s) Performed: Procedure(s) (LRB):  EGD (ESOPHAGOGASTRODUODENOSCOPY) (N/A)    Patient location: GI    Anesthesia Type: general    Transport from OR: Transported from OR on room air with adequate spontaneous ventilation    Post pain: adequate analgesia    Post assessment: no apparent anesthetic complications and tolerated procedure well    Post vital signs: stable    Level of consciousness: lethargic    Nausea/Vomiting: no nausea/vomiting    Complications: none    Transfer of care protocol was followed      Last vitals: Visit Vitals  BP (!) 149/91 (BP Location: Left arm, Patient Position: Lying)   Pulse 76   Temp 36.4 °C (97.5 °F) (Oral)   Resp 16   Ht 5' 7" (1.702 m)   Wt 72.6 kg (160 lb)   SpO2 100%   BMI 25.06 kg/m²     "

## 2024-02-05 NOTE — ANESTHESIA POSTPROCEDURE EVALUATION
Anesthesia Post Evaluation    Patient: Orlin Zamarripa    Procedure(s) Performed: Procedure(s) (LRB):  EGD (ESOPHAGOGASTRODUODENOSCOPY) (N/A)    Final Anesthesia Type: general      Patient location during evaluation: PACU  Patient participation: Yes- Able to Participate  Level of consciousness: awake and alert  Post-procedure vital signs: reviewed and stable  Pain management: adequate  Airway patency: patent    PONV status: None or treated.  Anesthetic complications: no      Cardiovascular status: hemodynamically stable  Respiratory status: spontaneous ventilation  Hydration status: euvolemic  Follow-up not needed.          Vitals Value Taken Time   /78 02/05/24 1255   Temp 36.3 °C (97.4 °F) 02/05/24 1255   Pulse 74 02/05/24 1255   Resp 14 02/05/24 1255   SpO2 99 % 02/05/24 1255         Event Time   Out of Recovery 12:56:45         Pain/Jennifer Score: Jennifer Score: 10 (2/5/2024 12:54 PM)

## 2024-02-08 LAB
FINAL PATHOLOGIC DIAGNOSIS: NORMAL
GROSS: NORMAL
Lab: NORMAL
SUPPLEMENTAL DIAGNOSIS: NORMAL

## 2024-03-04 ENCOUNTER — OFFICE VISIT (OUTPATIENT)
Dept: CARDIOLOGY | Facility: CLINIC | Age: 59
End: 2024-03-04
Payer: COMMERCIAL

## 2024-03-04 VITALS
BODY MASS INDEX: 28.41 KG/M2 | WEIGHT: 181 LBS | SYSTOLIC BLOOD PRESSURE: 122 MMHG | DIASTOLIC BLOOD PRESSURE: 72 MMHG | OXYGEN SATURATION: 98 % | HEART RATE: 68 BPM | HEIGHT: 67 IN

## 2024-03-04 DIAGNOSIS — E78.2 MIXED HYPERLIPIDEMIA: ICD-10-CM

## 2024-03-04 DIAGNOSIS — D69.6 THROMBOCYTOPENIA: ICD-10-CM

## 2024-03-04 DIAGNOSIS — M54.30 SCIATIC LEG PAIN: ICD-10-CM

## 2024-03-04 DIAGNOSIS — M79.604 PAIN OF RIGHT LOWER EXTREMITY: Primary | ICD-10-CM

## 2024-03-04 DIAGNOSIS — I10 PRIMARY HYPERTENSION: ICD-10-CM

## 2024-03-04 DIAGNOSIS — I25.10 CORONARY ARTERY DISEASE INVOLVING NATIVE CORONARY ARTERY OF NATIVE HEART WITHOUT ANGINA PECTORIS: ICD-10-CM

## 2024-03-04 PROCEDURE — 1159F MED LIST DOCD IN RCRD: CPT | Mod: CPTII,S$GLB,, | Performed by: INTERNAL MEDICINE

## 2024-03-04 PROCEDURE — 3074F SYST BP LT 130 MM HG: CPT | Mod: CPTII,S$GLB,, | Performed by: INTERNAL MEDICINE

## 2024-03-04 PROCEDURE — 3008F BODY MASS INDEX DOCD: CPT | Mod: CPTII,S$GLB,, | Performed by: INTERNAL MEDICINE

## 2024-03-04 PROCEDURE — 3078F DIAST BP <80 MM HG: CPT | Mod: CPTII,S$GLB,, | Performed by: INTERNAL MEDICINE

## 2024-03-04 PROCEDURE — 99214 OFFICE O/P EST MOD 30 MIN: CPT | Mod: S$GLB,,, | Performed by: INTERNAL MEDICINE

## 2024-03-04 PROCEDURE — 99999 PR PBB SHADOW E&M-EST. PATIENT-LVL IV: CPT | Mod: PBBFAC,,, | Performed by: INTERNAL MEDICINE

## 2024-03-04 NOTE — PROGRESS NOTES
Subjective:    Patient ID:  Orlin Zamarripa is a 58 y.o. male who presents for follow-up of Coronary Artery Disease      Referring physician:  Self referred    HPI  Mr. Zamarripa is a 58 y.o. gentleman with HTN, HLD, CAD and former smoker. He had been having exertional chest pain for approximately 1 year. He had an angiogram performed at outside hospital, which revealed  to RCA, severe stenosis to mid LAD, and severe stenosis to distal left circumflex artery.  He was evaluated by Dr. Mohan for bypass surgery, however, he was deemed not to be a candidate given cirrhosis/HCV.  He underwent PCI of the LAD On 5/8/23. He underwent RCA  PCI on 7/10/23. Since then he denies any angina or HUNTLEY. He is now able to mow his entire lawn rapidly without having to stop. He denies lower extremity edema, PND, or  orthopnea. He denies palpitations, syncope, or near syncope.  Today the patient reports that since September or October of last year he has been experiencing posterior leg numbness in the calf more than the thigh.  He is symptoms are exacerbated by standing sitting and walking significant distances.  Symptoms are relieved by lying down.  He denies any wounds on his feet.    Past Medical History:   Diagnosis Date    Hypertension     Mixed hyperlipidemia     Portal hypertension 9/6/2023       Past Surgical History:   Procedure Laterality Date    ANGIOGRAM, CORONARY, WITH LEFT HEART CATHETERIZATION  7/10/2023    Procedure: Angiogram, Coronary, with Left Heart Cath;  Surgeon: Jose Hinton MD;  Location: [facility] CATH LAB;  Service: Cardiology;;    CARDIAC CATHETERIZATION  03/20/2023    CORONARY ANGIOGRAPHY Right 3/20/2023    Procedure: ANGIOGRAM, CORONARY ARTERY;  Surgeon: Esequiel Zamarripa MD;  Location: [facility] CATH LAB;  Service: Cardiology;  Laterality: Right;  radial    CORONARY ANGIOGRAPHY N/A 5/8/2023    Procedure: ANGIOGRAM, CORONARY ARTERY;  Surgeon: Jose Hinton MD;  Location: [facility] CATH LAB;  Service:  Cardiology;  Laterality: N/A;    ESOPHAGOGASTRODUODENOSCOPY N/A 2/5/2024    Procedure: EGD (ESOPHAGOGASTRODUODENOSCOPY);  Surgeon: Ivanna Ledbetter MD;  Location: [facility] ([address] [place] [address]);  Service: Endoscopy;  Laterality: N/A;  instructions to portal. Banding. labs 12/6  Referral: Dr. Lynn  10-16-to be done on Plavix-see encounter dated 9/7-MS  10/23 sent patient to [facility] for reschedule due to patient not holding plavix for 5 days. patient shikha for Feb pending Plavix hold    INSTANTANEOUS WAVE-FREE RATIO (IFR) N/A 5/8/2023    Procedure: Instantaneous Wave-Free Ratio (IFR);  Surgeon: Jose Hinton MD;  Location: [facility] CATH LAB;  Service: Cardiology;  Laterality: N/A;    IVUS, CORONARY  5/8/2023    Procedure: IVUS, Coronary;  Surgeon: Jose Hinton MD;  Location: [facility] CATH LAB;  Service: Cardiology;;    IVUS, CORONARY  7/10/2023    Procedure: IVUS, Coronary;  Surgeon: Jose Hinton MD;  Location: [facility] CATH LAB;  Service: Cardiology;;    LEFT HEART CATHETERIZATION Left 5/8/2023    Procedure: Left heart cath;  Surgeon: Jose Hinton MD;  Location: [facility] CATH LAB;  Service: Cardiology;  Laterality: Left;    REPAIR, CHRONIC TOTAL OCCLUSION, CORONARY N/A 7/10/2023    Procedure: Repair, Chronic Total Occlusion, Coronary;  Surgeon: Jose Hinton MD;  Location: [facility] CATH LAB;  Service: Cardiology;  Laterality: N/A;    STENT, DRUG ELUTING, MULTI VESSEL, CORONARY Left 5/8/2023    Procedure: Stent, Drug Eluting, Multi Vessel, Coronary;  Surgeon: Jose Hinton MD;  Location: [facility] CATH LAB;  Service: Cardiology;  Laterality: Left;  low bleeding risk 2.2%    STENT, DRUG ELUTING, SINGLE VESSEL, CORONARY  7/10/2023    Procedure: Stent, Drug Eluting, Single Vessel, Coronary;  Surgeon: Jose Hinton MD;  Location: [facility] CATH LAB;  Service: Cardiology;;       Current Outpatient Medications on File Prior to Visit   Medication Sig Dispense Refill    amLODIPine (NORVASC) 5 MG tablet Take 5 mg  by mouth once daily.      aspirin (ECOTRIN) 81 MG EC tablet Take 81 mg by mouth once daily.      atorvastatin (LIPITOR) 20 MG tablet Take 20 mg by mouth once daily.      clopidogreL (PLAVIX) 75 mg tablet Take 4 tablets (300mg) this evening, followed by 1 tablet (75mg) thereafter. 30 tablet 11    ergocalciferol (ERGOCALCIFEROL) 50,000 unit Cap Take 50,000 Units by mouth every 7 days.      metoprolol tartrate (LOPRESSOR) 25 MG tablet Take 25 mg by mouth.      nitroGLYCERIN (NITROSTAT) 0.4 MG SL tablet Place 1 tablet (0.4 mg total) under the tongue every 5 (five) minutes as needed for Chest pain. 30 tablet 3     Current Facility-Administered Medications on File Prior to Visit   Medication Dose Route Frequency Provider Last Rate Last Admin    0.9%  NaCl infusion   Intravenous Continuous Jose Hinton  mL/hr at 07/10/23 0708 New Bag at 07/10/23 0708    sodium chloride 0.9% flush 10 mL  10 mL Intravenous PRN Cristofer Hernandez MD           Review of patient's allergies indicates:  No Known Allergies    Social History     Tobacco Use    Smoking status: Former     Current packs/day: 0.00     Types: Cigarettes     Quit date: 4/20/2020     Years since quitting: 3.8    Smokeless tobacco: Never   Substance Use Topics    Alcohol use: Yes     Alcohol/week: 2.0 standard drinks of alcohol     Types: 1 Glasses of wine, 1 Cans of beer per week     Comment: rare    Drug use: Never       History reviewed. No pertinent family history.      Review of Systems   Constitutional: Negative for decreased appetite, diaphoresis, fever, malaise/fatigue, weight gain and weight loss.   HENT:  Negative for congestion, nosebleeds and sore throat.    Eyes:  Negative for blurred vision, vision loss in left eye, vision loss in right eye and visual disturbance.   Cardiovascular:  Negative for chest pain, claudication, dyspnea on exertion, leg swelling, near-syncope, orthopnea, palpitations, paroxysmal nocturnal dyspnea and syncope.  "  Respiratory:  Negative for cough, hemoptysis, shortness of breath and wheezing.    Endocrine: Negative for polyuria.   Hematologic/Lymphatic: Does not bruise/bleed easily.   Skin:  Negative for nail changes and rash.   Musculoskeletal:  Positive for muscle cramps and myalgias. Negative for back pain.   Gastrointestinal:  Negative for abdominal pain, change in bowel habit, diarrhea, heartburn, hematemesis, hematochezia, melena, nausea and vomiting.   Genitourinary:  Negative for bladder incontinence, dysuria, frequency and hematuria.   Psychiatric/Behavioral:  Negative for depression.    Allergic/Immunologic: Negative for hives.        Objective:     Vitals:    03/04/24 1524 03/04/24 1525   BP: 126/79 122/72   BP Location: Left arm Right arm   Patient Position: Sitting Sitting   BP Method: Large (Automatic) Large (Automatic)   Pulse: 69 68   SpO2: 98% 98%   Weight: 82.1 kg (181 lb)    Height: 5' 7" (1.702 m)         Physical Exam  Constitutional:       Appearance: Normal appearance. He is well-developed.   HENT:      Head: Normocephalic and atraumatic.   Eyes:      Extraocular Movements: Extraocular movements intact.   Neck:      Thyroid: No thyromegaly.      Vascular: No JVD.   Cardiovascular:      Rate and Rhythm: Normal rate and regular rhythm.      Chest Wall: PMI is displaced.      Pulses:           Carotid pulses are 2+ on the right side and 2+ on the left side.       Radial pulses are 2+ on the right side and 2+ on the left side.        Femoral pulses are 2+ on the right side and 2+ on the left side.       Dorsalis pedis pulses are 2+ on the right side and 2+ on the left side.        Posterior tibial pulses are 2+ on the right side and 2+ on the left side.      Heart sounds: Normal heart sounds. No murmur heard.     No friction rub. No gallop.   Pulmonary:      Effort: Pulmonary effort is normal.      Breath sounds: Normal breath sounds. No wheezing, rhonchi or rales.   Abdominal:      General: Bowel sounds " are normal. There is no distension.      Palpations: Abdomen is soft.      Tenderness: There is no abdominal tenderness.   Musculoskeletal:      Cervical back: Neck supple.   Skin:     General: Skin is warm and dry.      Findings: No erythema.   Neurological:      General: No focal deficit present.      Mental Status: He is alert and oriented to person, place, and time.      Gait: Gait normal.   Psychiatric:         Mood and Affect: Mood normal.         Behavior: Behavior normal.           Assessment:       1. Pain of right lower extremity    2. Primary hypertension    3. Mixed hyperlipidemia    4. Coronary artery disease involving native coronary artery of native heart without angina pectoris    5. Thrombocytopenia    6. Sciatic leg pain         Plan:       1) coronary artery disease.  The patient reports that is free of anginal and CHF symptoms.  -continue enteric-coated aspirin 81 mg p.o. q.d.  -continue Plavix 75 mg p.o. q.day  -rec starting aerobic exercise program with goal of 60 minutes, 5 days a week     2) hypertension.  The patient's blood pressure is adequately controlled in clinic today.  Continue Norvasc 5 mg p.o. q.day     3) dyslipidemia.  03/17/2023 lipid panel reviewed.  Continue Lipitor 20 mg p.o. q.day unless hepatology deems this should be stopped in light of the patient's liver disease     4) HCV cirrhosis.  Rec follow-up with hepatology      5) thrombocytopenia.  The patient's platelet count was 160 on 5/31/23; on 07/10/2023 8 was 123.  On 02/05/2024 it was 157. this is likely secondary to his liver disease; recommend he follow up with hepatology    6) right lower extremity discomfort.  The patient's symptoms are concerning for sciatica but the differential diagnosis also includes claudication pain.  His vascular exam is normal however he did undergo arterial access of his right CFA in July 2023.  The patient denies any history of back trauma or heavy lifting.  Will order a right lower  extremity arterial duplex if this study does not demonstrate PID then will refer the patient to spine clinic for further evaluation.     All of the patient's questions were answered.

## 2024-03-05 ENCOUNTER — OFFICE VISIT (OUTPATIENT)
Dept: ORTHOPEDICS | Facility: CLINIC | Age: 59
End: 2024-03-05
Payer: COMMERCIAL

## 2024-03-05 ENCOUNTER — HOSPITAL ENCOUNTER (OUTPATIENT)
Dept: RADIOLOGY | Facility: HOSPITAL | Age: 59
Discharge: HOME | End: 2024-03-05
Attending: ORTHOPAEDIC SURGERY
Payer: COMMERCIAL

## 2024-03-05 VITALS — BODY MASS INDEX: 28.41 KG/M2 | WEIGHT: 181 LBS | HEIGHT: 67 IN

## 2024-03-05 DIAGNOSIS — M51.36 DDD (DEGENERATIVE DISC DISEASE), LUMBAR: ICD-10-CM

## 2024-03-05 DIAGNOSIS — M54.30 SCIATIC LEG PAIN: ICD-10-CM

## 2024-03-05 DIAGNOSIS — M51.36 DDD (DEGENERATIVE DISC DISEASE), LUMBAR: Primary | ICD-10-CM

## 2024-03-05 PROCEDURE — 1159F MED LIST DOCD IN RCRD: CPT | Mod: CPTII,S$GLB,, | Performed by: ORTHOPAEDIC SURGERY

## 2024-03-05 PROCEDURE — 99204 OFFICE O/P NEW MOD 45 MIN: CPT | Mod: S$GLB,,, | Performed by: ORTHOPAEDIC SURGERY

## 2024-03-05 PROCEDURE — 99999 PR PBB SHADOW E&M-EST. PATIENT-LVL III: CPT | Mod: PBBFAC,,, | Performed by: ORTHOPAEDIC SURGERY

## 2024-03-05 PROCEDURE — 72110 X-RAY EXAM L-2 SPINE 4/>VWS: CPT | Mod: 26,,, | Performed by: RADIOLOGY

## 2024-03-05 PROCEDURE — 3008F BODY MASS INDEX DOCD: CPT | Mod: CPTII,S$GLB,, | Performed by: ORTHOPAEDIC SURGERY

## 2024-03-05 PROCEDURE — 72110 X-RAY EXAM L-2 SPINE 4/>VWS: CPT | Mod: TC

## 2024-03-05 NOTE — PROGRESS NOTES
DATE: 3/5/2024  PATIENT: Orlin Zamarripa    Supervising Physician: Rob Crawley M.D.    CHIEF COMPLAINT: right leg pain    HISTORY:  Orlin Zamarripa is a 58 y.o. male here for initial evaluation of low back and right leg pain (Back - 0, Leg - 2).  The pain in the back/side of the right leg is what bothers him most.  The pain has been present for months without injury. The patient describes the pain as shooting.  The pain is worse with standing long and improved by rest. There is positive associated numbness and tingling. There is negative subjective weakness. Prior treatments have included no PT, ESIs, surgery.    The patient denies myelopathic symptoms such as handwriting changes or difficulty with buttons/coins/keys. Denies perineal paresthesias, bowel/bladder dysfunction.    PAST MEDICAL/SURGICAL HISTORY:  Past Medical History:   Diagnosis Date    Hypertension     Mixed hyperlipidemia     Portal hypertension 9/6/2023     Past Surgical History:   Procedure Laterality Date    ANGIOGRAM, CORONARY, WITH LEFT HEART CATHETERIZATION  7/10/2023    Procedure: Angiogram, Coronary, with Left Heart Cath;  Surgeon: Jose Hinton MD;  Location: [facility] CATH LAB;  Service: Cardiology;;    CARDIAC CATHETERIZATION  03/20/2023    CORONARY ANGIOGRAPHY Right 3/20/2023    Procedure: ANGIOGRAM, CORONARY ARTERY;  Surgeon: Esequiel Zamarripa MD;  Location: [facility] CATH LAB;  Service: Cardiology;  Laterality: Right;  radial    CORONARY ANGIOGRAPHY N/A 5/8/2023    Procedure: ANGIOGRAM, CORONARY ARTERY;  Surgeon: Jose Hinton MD;  Location: [facility] CATH LAB;  Service: Cardiology;  Laterality: N/A;    ESOPHAGOGASTRODUODENOSCOPY N/A 2/5/2024    Procedure: EGD (ESOPHAGOGASTRODUODENOSCOPY);  Surgeon: Ivanna Ledbetter MD;  Location: [facility] ENDO ([address] [place] [address]);  Service: Endoscopy;  Laterality: N/A;  instructions to portal. Banding. labs 12/6  Referral: Dr. Lynn  10-16-to be done on Plavix-see encounter dated 9/7-MS  10/23 sent patient to [facility] for  reschedule due to patient not holding plavix for 5 days. patient shikha for Feb pending Plavix hold    INSTANTANEOUS WAVE-FREE RATIO (IFR) N/A 5/8/2023    Procedure: Instantaneous Wave-Free Ratio (IFR);  Surgeon: Jose Hinton MD;  Location: [facility] CATH LAB;  Service: Cardiology;  Laterality: N/A;    IVUS, CORONARY  5/8/2023    Procedure: IVUS, Coronary;  Surgeon: Jose Hinton MD;  Location: [facility] CATH LAB;  Service: Cardiology;;    IVUS, CORONARY  7/10/2023    Procedure: IVUS, Coronary;  Surgeon: Jose Hinton MD;  Location: [facility] CATH LAB;  Service: Cardiology;;    LEFT HEART CATHETERIZATION Left 5/8/2023    Procedure: Left heart cath;  Surgeon: Jose Hinton MD;  Location: [facility] CATH LAB;  Service: Cardiology;  Laterality: Left;    REPAIR, CHRONIC TOTAL OCCLUSION, CORONARY N/A 7/10/2023    Procedure: Repair, Chronic Total Occlusion, Coronary;  Surgeon: Jose Hinton MD;  Location: [facility] CATH LAB;  Service: Cardiology;  Laterality: N/A;    STENT, DRUG ELUTING, MULTI VESSEL, CORONARY Left 5/8/2023    Procedure: Stent, Drug Eluting, Multi Vessel, Coronary;  Surgeon: Jose Hinton MD;  Location: [facility] CATH LAB;  Service: Cardiology;  Laterality: Left;  low bleeding risk 2.2%    STENT, DRUG ELUTING, SINGLE VESSEL, CORONARY  7/10/2023    Procedure: Stent, Drug Eluting, Single Vessel, Coronary;  Surgeon: Jose Hinton MD;  Location: [facility] CATH LAB;  Service: Cardiology;;       Medications:   Current Outpatient Medications on File Prior to Visit   Medication Sig Dispense Refill    amLODIPine (NORVASC) 5 MG tablet Take 5 mg by mouth once daily.      aspirin (ECOTRIN) 81 MG EC tablet Take 81 mg by mouth once daily.      atorvastatin (LIPITOR) 20 MG tablet Take 20 mg by mouth once daily.      clopidogreL (PLAVIX) 75 mg tablet Take 4 tablets (300mg) this evening, followed by 1 tablet (75mg) thereafter. 30 tablet 11    ergocalciferol (ERGOCALCIFEROL) 50,000 unit Cap Take 50,000 Units by  mouth every 7 days.      metoprolol tartrate (LOPRESSOR) 25 MG tablet Take 25 mg by mouth.      nitroGLYCERIN (NITROSTAT) 0.4 MG SL tablet Place 1 tablet (0.4 mg total) under the tongue every 5 (five) minutes as needed for Chest pain. 30 tablet 3     Current Facility-Administered Medications on File Prior to Visit   Medication Dose Route Frequency Provider Last Rate Last Admin    0.9%  NaCl infusion   Intravenous Continuous Jose Hinton  mL/hr at 07/10/23 0708 New Bag at 07/10/23 0708    sodium chloride 0.9% flush 10 mL  10 mL Intravenous PRN Cristofer Hernandez MD           Social History:   Social History     Socioeconomic History    Marital status:    Tobacco Use    Smoking status: Former     Current packs/day: 0.00     Types: Cigarettes     Quit date: 4/20/2020     Years since quitting: 3.8    Smokeless tobacco: Never   Substance and Sexual Activity    Alcohol use: Yes     Alcohol/week: 2.0 standard drinks of alcohol     Types: 1 Glasses of wine, 1 Cans of beer per week     Comment: rare    Drug use: Never       REVIEW OF SYSTEMS:  Constitution: Negative. Negative for chills, fever and night sweats.   Cardiovascular: Negative for chest pain and syncope.   Respiratory: Negative for cough and shortness of breath.   Gastrointestinal: See HPI. Negative for nausea/vomiting. Negative for abdominal pain.  Genitourinary: See HPI. Negative for discoloration or dysuria.  Skin: Negative for dry skin, itching and rash.   Hematologic/Lymphatic: Negative for bleeding problem. Does not bruise/bleed easily.   Musculoskeletal: Negative for falls and muscle weakness.   Neurological: See HPI. No seizures.   Endocrine: Negative for polydipsia, polyphagia and polyuria.   Allergic/Immunologic: Negative for hives and persistent infections.     EXAM:  There were no vitals taken for this visit.    General: The patient is a very pleasant 58 y.o. male in no apparent distress, the patient is oriented to person, place and  "time.  Psych: Normal mood and affect  HEENT: Vision grossly intact, hearing intact to the spoken word.  Lungs: Respirations unlabored.  Gait: Normal station and gait, no difficulty with toe or heel walk.   Skin: Dorsal lumbar skin negative for rashes, lesions, hairy patches and surgical scars. There is negative lumbar tenderness to palpation.  Range of motion: Lumbar range of motion is acceptable.  Spinal Balance: Global saggital and coronal spinal balance acceptable, not significant for scoliosis and kyphosis.  Musculoskeletal: No pain with the range of motion of the bilateral hips. No trochanteric tenderness to palpation.  Vascular: Bilateral lower extremities warm and well perfused, dorsalis pedis pulses 2+ bilaterally.  Neurological: Normal strength and tone in all major motor groups in the bilateral lower extremities. Normal sensation to light touch in the L2-S1 dermatomes bilaterally.  Deep tendon reflexes symmetric 2+ in the bilateral lower extremities.  Negative Babinski bilaterally. Straight leg raise negative bilaterally.    IMAGING:      Today I personally reviewed AP, Lat and Flex/Ex  upright L-spine films that demonstrate mild degenerative changes      There is no height or weight on file to calculate BMI.    No results found for: "HGBA1C"        ASSESSMENT/PLAN:    There are no diagnoses linked to this encounter.    Today we discussed at length all of the different treatment options including anti-inflammatories, acetaminophen, rest, ice, heat, physical therapy including strengthening and stretching exercises, home exercises, ROM, aerobic conditioning, aqua therapy, other modalities including ultrasound, massage, and dry needling, epidural steroid injections and finally surgical intervention.      Pt presents with chronic lumbar radiculopathy. Will send PT orders to [facility]. Pt will fu if pain persists.      "

## 2024-03-06 ENCOUNTER — LAB VISIT (OUTPATIENT)
Dept: LAB | Facility: HOSPITAL | Age: 59
End: 2024-03-06
Attending: INTERNAL MEDICINE
Payer: COMMERCIAL

## 2024-03-06 DIAGNOSIS — K74.69 OTHER CIRRHOSIS OF LIVER: ICD-10-CM

## 2024-03-06 LAB
ALBUMIN SERPL BCP-MCNC: 4.5 G/DL (ref 3.5–5.2)
ALP SERPL-CCNC: 55 U/L (ref 55–135)
ALT SERPL W/O P-5'-P-CCNC: 36 U/L (ref 10–44)
ANION GAP SERPL CALC-SCNC: 6 MMOL/L (ref 8–16)
AST SERPL-CCNC: 31 U/L (ref 10–40)
BASOPHILS # BLD AUTO: 0.01 K/UL (ref 0–0.2)
BASOPHILS NFR BLD: 0.2 % (ref 0–1.9)
BILIRUB DIRECT SERPL-MCNC: 0.1 MG/DL (ref 0.1–0.3)
BILIRUB SERPL-MCNC: 0.7 MG/DL (ref 0.1–1)
BUN SERPL-MCNC: 20 MG/DL (ref 6–20)
CALCIUM SERPL-MCNC: 9 MG/DL (ref 8.7–10.5)
CHLORIDE SERPL-SCNC: 103 MMOL/L (ref 95–110)
CO2 SERPL-SCNC: 27 MMOL/L (ref 23–29)
CREAT SERPL-MCNC: 1 MG/DL (ref 0.5–1.4)
DIFFERENTIAL METHOD BLD: ABNORMAL
EOSINOPHIL # BLD AUTO: 0.1 K/UL (ref 0–0.5)
EOSINOPHIL NFR BLD: 3.2 % (ref 0–8)
ERYTHROCYTE [DISTWIDTH] IN BLOOD BY AUTOMATED COUNT: 12.6 % (ref 11.5–14.5)
EST. GFR  (NO RACE VARIABLE): >60 ML/MIN/1.73 M^2
GLUCOSE SERPL-MCNC: 160 MG/DL (ref 70–110)
HCT VFR BLD AUTO: 42.6 % (ref 40–54)
HGB BLD-MCNC: 14.6 G/DL (ref 14–18)
IMM GRANULOCYTES # BLD AUTO: 0.01 K/UL (ref 0–0.04)
IMM GRANULOCYTES NFR BLD AUTO: 0.2 % (ref 0–0.5)
INR PPP: 1 (ref 0.8–1.2)
LYMPHOCYTES # BLD AUTO: 1.3 K/UL (ref 1–4.8)
LYMPHOCYTES NFR BLD: 29.1 % (ref 18–48)
MCH RBC QN AUTO: 31.9 PG (ref 27–31)
MCHC RBC AUTO-ENTMCNC: 34.3 G/DL (ref 32–36)
MCV RBC AUTO: 93 FL (ref 82–98)
MONOCYTES # BLD AUTO: 0.3 K/UL (ref 0.3–1)
MONOCYTES NFR BLD: 6.7 % (ref 4–15)
NEUTROPHILS # BLD AUTO: 2.6 K/UL (ref 1.8–7.7)
NEUTROPHILS NFR BLD: 60.6 % (ref 38–73)
NRBC BLD-RTO: 0 /100 WBC
PLATELET # BLD AUTO: 160 K/UL (ref 150–450)
PMV BLD AUTO: 9.2 FL (ref 9.2–12.9)
POTASSIUM SERPL-SCNC: 4.2 MMOL/L (ref 3.5–5.1)
PROT SERPL-MCNC: 7.9 G/DL (ref 6–8.4)
PROTHROMBIN TIME: 10.7 SEC (ref 9–12.5)
RBC # BLD AUTO: 4.57 M/UL (ref 4.6–6.2)
SODIUM SERPL-SCNC: 136 MMOL/L (ref 136–145)
WBC # BLD AUTO: 4.36 K/UL (ref 3.9–12.7)

## 2024-03-06 PROCEDURE — 82248 BILIRUBIN DIRECT: CPT | Performed by: INTERNAL MEDICINE

## 2024-03-06 PROCEDURE — 80053 COMPREHEN METABOLIC PANEL: CPT | Performed by: INTERNAL MEDICINE

## 2024-03-06 PROCEDURE — 85610 PROTHROMBIN TIME: CPT | Performed by: INTERNAL MEDICINE

## 2024-03-06 PROCEDURE — 30000890 LABCORP MISCELLANEOUS TEST: Performed by: INTERNAL MEDICINE

## 2024-03-06 PROCEDURE — 80321 ALCOHOLS BIOMARKERS 1OR 2: CPT | Performed by: INTERNAL MEDICINE

## 2024-03-06 PROCEDURE — 36415 COLL VENOUS BLD VENIPUNCTURE: CPT | Performed by: INTERNAL MEDICINE

## 2024-03-06 PROCEDURE — 82105 ALPHA-FETOPROTEIN SERUM: CPT | Performed by: INTERNAL MEDICINE

## 2024-03-06 PROCEDURE — 85025 COMPLETE CBC W/AUTO DIFF WBC: CPT | Performed by: INTERNAL MEDICINE

## 2024-03-07 ENCOUNTER — TELEPHONE (OUTPATIENT)
Dept: GASTROENTEROLOGY | Facility: CLINIC | Age: 59
End: 2024-03-07
Payer: COMMERCIAL

## 2024-03-07 ENCOUNTER — OFFICE VISIT (OUTPATIENT)
Dept: HEPATOLOGY | Facility: CLINIC | Age: 59
End: 2024-03-07
Payer: COMMERCIAL

## 2024-03-07 ENCOUNTER — TELEPHONE (OUTPATIENT)
Dept: HEPATOLOGY | Facility: CLINIC | Age: 59
End: 2024-03-07

## 2024-03-07 VITALS
HEART RATE: 64 BPM | SYSTOLIC BLOOD PRESSURE: 116 MMHG | TEMPERATURE: 99 F | OXYGEN SATURATION: 97 % | DIASTOLIC BLOOD PRESSURE: 70 MMHG | WEIGHT: 180.56 LBS | BODY MASS INDEX: 28.34 KG/M2 | HEIGHT: 67 IN

## 2024-03-07 DIAGNOSIS — K76.6 PORTAL HYPERTENSION: ICD-10-CM

## 2024-03-07 DIAGNOSIS — K74.69 OTHER CIRRHOSIS OF LIVER: Primary | ICD-10-CM

## 2024-03-07 DIAGNOSIS — B19.20 HEPATITIS C VIRUS INFECTION WITHOUT HEPATIC COMA, UNSPECIFIED CHRONICITY: ICD-10-CM

## 2024-03-07 DIAGNOSIS — A04.8 H. PYLORI INFECTION: Primary | ICD-10-CM

## 2024-03-07 LAB — AFP-TM SERPL-MCNC: 3.5 NG/ML (ref 0–8.4)

## 2024-03-07 PROCEDURE — 3074F SYST BP LT 130 MM HG: CPT | Mod: CPTII,S$GLB,, | Performed by: INTERNAL MEDICINE

## 2024-03-07 PROCEDURE — 3078F DIAST BP <80 MM HG: CPT | Mod: CPTII,S$GLB,, | Performed by: INTERNAL MEDICINE

## 2024-03-07 PROCEDURE — 99214 OFFICE O/P EST MOD 30 MIN: CPT | Mod: S$GLB,,, | Performed by: INTERNAL MEDICINE

## 2024-03-07 PROCEDURE — 1159F MED LIST DOCD IN RCRD: CPT | Mod: CPTII,S$GLB,, | Performed by: INTERNAL MEDICINE

## 2024-03-07 PROCEDURE — 1160F RVW MEDS BY RX/DR IN RCRD: CPT | Mod: CPTII,S$GLB,, | Performed by: INTERNAL MEDICINE

## 2024-03-07 PROCEDURE — 99999 PR PBB SHADOW E&M-EST. PATIENT-LVL V: CPT | Mod: PBBFAC,,, | Performed by: INTERNAL MEDICINE

## 2024-03-07 PROCEDURE — 3008F BODY MASS INDEX DOCD: CPT | Mod: CPTII,S$GLB,, | Performed by: INTERNAL MEDICINE

## 2024-03-07 RX ORDER — DOXYCYCLINE 100 MG/1
100 CAPSULE ORAL 2 TIMES DAILY
Qty: 28 CAPSULE | Refills: 0 | Status: SHIPPED | OUTPATIENT
Start: 2024-03-07 | End: 2024-03-21

## 2024-03-07 RX ORDER — BISMUTH SUBSALICYLATE 262 MG/1
262 TABLET ORAL 4 TIMES DAILY
Qty: 56 TABLET | Refills: 0 | Status: SHIPPED | OUTPATIENT
Start: 2024-03-07 | End: 2024-03-21

## 2024-03-07 RX ORDER — METRONIDAZOLE 250 MG/1
250 TABLET ORAL EVERY 6 HOURS
Qty: 56 TABLET | Refills: 0 | Status: SHIPPED | OUTPATIENT
Start: 2024-03-07 | End: 2024-03-21

## 2024-03-07 RX ORDER — PANTOPRAZOLE SODIUM 40 MG/1
40 TABLET, DELAYED RELEASE ORAL 2 TIMES DAILY
Qty: 28 TABLET | Refills: 0 | Status: SHIPPED | OUTPATIENT
Start: 2024-03-07 | End: 2024-03-21

## 2024-03-07 NOTE — PATIENT INSTRUCTIONS
Labs every 12 weeks- next due 06/24  Abdo US every 6 months with AFP- next due 06/24  EGD - repeat 02/26  Return 6 months

## 2024-03-07 NOTE — TELEPHONE ENCOUNTER
[facility] Note    Gastric biopsies positive for H.  Pylori.  Quadruple therapy prescribed.  I left a voicemail for the patient regarding this but I will continue to try to reach him.  I will mail him a stool kit to submit in 2 months to confirm eradication.    Ivanna Ledbetter MD

## 2024-03-07 NOTE — TELEPHONE ENCOUNTER
----- Message from Ivanna Ledbetter MD sent at 3/7/2024  9:47 AM CST -----  Regarding: Stool H.  pylori kit  Can you mail him an H.  Pylori stool kit to submit in 2 months?

## 2024-03-07 NOTE — TELEPHONE ENCOUNTER
Upper sorbian  ID [id number] (Usman) was used to completed visited.  The patient was given the contact information to scheduled  [phone].  A Recall submitted for 6 Mos F/U.   Upper sorbian  is needed for 6 Mos F/U.  Patient speaks English, but does not understand all English words or communication.

## 2024-03-07 NOTE — PROGRESS NOTES
HEPATOLOGY FOLLOW UP    Referring Physician:  Romy Vences,  Current Corresponding Physician:  Romy Vences, Berna Hess, SPP-C, Jose Hinton MD    Orlin Zamarripa is here for follow up of Cirrhosis      HPI  Orlin Zamarripa is a 58 y.o. male CAD on anticoagulation who presented 5/31/23 for evaluation of cirrhosis secondary to hepatitis C.     --In 2013: tx with peg/riba and telap for 10 months only- achieved SVR  --concern for possible HCC since nodule seen on abdo US in 2013-underwent CT scan:  TP CT 3/20/2013: no liver lesion seen; + portal htn  --since then patient denies any symptoms of decompensated cirrhosis, including no ascites or edema, cognitive problems that would suggest hepatic encephalopathy, or GI bleeding from varices.      CT chest abdo pelvis wo contrast 3/27/23: There is nodular contour liver, suggestive of cirrhosis.  No discrete hepatic lesion is identified.     Labs 3/17/23: ALT 39, AST 31, ALKP 61, Tbil 0.7, ptls 144  HCV RNA 3/12/2013: negative      MELD-Na: 6 at 5/31/2023  9:20 AM  MELD: 6 at 5/31/2023  9:20 AM  Calculated from:  Serum Creatinine: 0.9 mg/dL (Using min of 1 mg/dL) at 5/31/2023  9:20 AM  Serum Sodium: 141 mmol/L (Using max of 137 mmol/L) at 5/31/2023  9:20 AM  Total Bilirubin: 0.8 mg/dL (Using min of 1 mg/dL) at 5/31/2023  9:20 AM  INR(ratio): 1.0 at 5/31/2023  9:20 AM    Interval History  Since Orlin Zamarripa's last visit:  --severe CAD- now s/p stent placement x 4 07/23    Serologic w/u: HCV Ab+, HCV RNA-, HBsAg-, HBcAb+, HBsAb+, HBeAg=, HBeAb-, HBV DNA-, HAV IgG+, KIKI-, ASMA+ (1:320), AMA-, IgG 1721, alpha 1 antitrypsin level normal, iron sat 42%, ferritin 708, peth <10    EGD 2/5/24: no EV; repeat 2026    MRI abdo w wo contrast 6/12/23: no liver cancer  Abdo US 12/6/23: no HCC    The patient continues to deny any symptoms of decompensated cirrhosis, including no ascites or edema, cognitive problems that would suggest hepatic encephalopathy, or GI bleeding  from varices.     MELD 3.0: 7 at 3/6/2024  9:35 AM  MELD-Na: 6 at 3/6/2024  9:35 AM  Calculated from:  Serum Creatinine: 1.0 mg/dL at 3/6/2024  9:35 AM  Serum Sodium: 136 mmol/L at 3/6/2024  9:35 AM  Total Bilirubin: 0.7 mg/dL (Using min of 1 mg/dL) at 3/6/2024  9:35 AM  Serum Albumin: 4.5 g/dL (Using max of 3.5 g/dL) at 3/6/2024  9:35 AM  INR(ratio): 1.0 at 3/6/2024  9:35 AM  Age at listing (hypothetical): 58 years  Sex: Male at 3/6/2024  9:35 AM       Outpatient Encounter Medications as of 3/7/2024   Medication Sig Dispense Refill    amLODIPine (NORVASC) 5 MG tablet Take 5 mg by mouth once daily.      aspirin (ECOTRIN) 81 MG EC tablet Take 81 mg by mouth once daily.      atorvastatin (LIPITOR) 20 MG tablet Take 20 mg by mouth once daily.      clopidogreL (PLAVIX) 75 mg tablet Take 4 tablets (300mg) this evening, followed by 1 tablet (75mg) thereafter. 30 tablet 11    ergocalciferol (ERGOCALCIFEROL) 50,000 unit Cap Take 50,000 Units by mouth every 7 days.      metoprolol tartrate (LOPRESSOR) 25 MG tablet Take 25 mg by mouth.      nitroGLYCERIN (NITROSTAT) 0.4 MG SL tablet Place 1 tablet (0.4 mg total) under the tongue every 5 (five) minutes as needed for Chest pain. 30 tablet 3     Facility-Administered Encounter Medications as of 3/7/2024   Medication Dose Route Frequency Provider Last Rate Last Admin    0.9%  NaCl infusion   Intravenous Continuous Jose Hinton  mL/hr at 07/10/23 0708 New Bag at 07/10/23 0708    sodium chloride 0.9% flush 10 mL  10 mL Intravenous PRN Cristofer Hernandez MD         Review of patient's allergies indicates:  No Known Allergies  Past Medical History:   Diagnosis Date    Hypertension     Mixed hyperlipidemia     Portal hypertension 9/6/2023       Review of Systems   Constitutional: Negative.    HENT: Negative.     Eyes: Negative.    Respiratory: Negative.     Cardiovascular: Negative.    Gastrointestinal: Negative.    Genitourinary: Negative.    Musculoskeletal: Negative.     Skin: Negative.    Neurological: Negative.    Psychiatric/Behavioral: Negative.         Vitals:    03/07/24 0916   BP: 116/70   Pulse: 64   Temp: 98.6 °F (37 °C)        Physical Exam  Vitals reviewed.   Constitutional:       Appearance: He is well-developed.   HENT:      Head: Normocephalic and atraumatic.   Eyes:      General: No scleral icterus.     Conjunctiva/sclera: Conjunctivae normal.      Pupils: Pupils are equal, round, and reactive to light.   Neck:      Thyroid: No thyromegaly.   Cardiovascular:      Rate and Rhythm: Normal rate and regular rhythm.      Heart sounds: Normal heart sounds.   Pulmonary:      Effort: Pulmonary effort is normal.      Breath sounds: Normal breath sounds. No rales.   Abdominal:      General: Bowel sounds are normal. There is no distension.      Palpations: Abdomen is soft. There is no mass.      Tenderness: There is no abdominal tenderness.   Musculoskeletal:         General: Normal range of motion.      Cervical back: Normal range of motion and neck supple.   Skin:     General: Skin is warm and dry.      Findings: No rash.   Neurological:      Mental Status: He is alert and oriented to person, place, and time.         MELD 3.0: 7 at 3/6/2024  9:35 AM  MELD-Na: 6 at 3/6/2024  9:35 AM  Calculated from:  Serum Creatinine: 1.0 mg/dL at 3/6/2024  9:35 AM  Serum Sodium: 136 mmol/L at 3/6/2024  9:35 AM  Total Bilirubin: 0.7 mg/dL (Using min of 1 mg/dL) at 3/6/2024  9:35 AM  Serum Albumin: 4.5 g/dL (Using max of 3.5 g/dL) at 3/6/2024  9:35 AM  INR(ratio): 1.0 at 3/6/2024  9:35 AM  Age at listing (hypothetical): 58 years  Sex: Male at 3/6/2024  9:35 AM      Lab Results   Component Value Date     (H) 03/06/2024    BUN 20 03/06/2024    CREATININE 1.0 03/06/2024    CALCIUM 9.0 03/06/2024     03/06/2024    K 4.2 03/06/2024     03/06/2024    PROT 7.9 03/06/2024    CO2 27 03/06/2024    ANIONGAP 6 (L) 03/06/2024    WBC 4.36 03/06/2024    RBC 4.57 (L) 03/06/2024    HGB 14.6  03/06/2024    HCT 42.6 03/06/2024    MCV 93 03/06/2024    MCH 31.9 (H) 03/06/2024    MCHC 34.3 03/06/2024     Lab Results   Component Value Date    RDW 12.6 03/06/2024     03/06/2024    MPV 9.2 03/06/2024    GRAN 2.6 03/06/2024    GRAN 60.6 03/06/2024    LYMPH 1.3 03/06/2024    LYMPH 29.1 03/06/2024    MONO 0.3 03/06/2024    MONO 6.7 03/06/2024    EOSINOPHIL 3.2 03/06/2024    BASOPHIL 0.2 03/06/2024    EOS 0.1 03/06/2024    BASO 0.01 03/06/2024    GROUPTRH AB POS 07/10/2023    CHOL 104 (L) 03/17/2023    TRIG 75 03/17/2023    HDL 35 (L) 03/17/2023    CHOLHDL 33.7 03/17/2023    TOTALCHOLEST 3.0 03/17/2023    ALBUMIN 4.5 03/06/2024    BILIDIR 0.1 03/06/2024    AST 31 03/06/2024    ALT 36 03/06/2024    ALKPHOS 55 03/06/2024    LABPROT 10.7 03/06/2024    INR 1.0 03/06/2024       Assessment and Plan:  Orlin Zamarripa is a 58 y.o. male with cirrhosis likely from HCV, now cured. Current recommendations:  Cirrhosis, MELD <15:Serologic w/u for CLD shows very +ASMA and elevated IgG. Should liver enzymes increase will consider liver biopsy to r/o AIH; repeat meld labs every 3 months  Portal htn and thrombocytopenia: repeat EGD to screen for varices 2/2026  Liver lesion hx: none on ct or MRI abdo; recommend repeat surveillance for HCC with abdo US every 6 mnths- next due 06/24    Return 6 months  A total of 35 minutes was spent reviewing the patient's chart, examining the patient, reviewing labs and imaging and coordinating care with the patient's care team.

## 2024-03-11 ENCOUNTER — HOSPITAL ENCOUNTER (OUTPATIENT)
Dept: CARDIOLOGY | Facility: HOSPITAL | Age: 59
Discharge: HOME | End: 2024-03-11
Attending: INTERNAL MEDICINE
Payer: COMMERCIAL

## 2024-03-11 DIAGNOSIS — M79.604 PAIN OF RIGHT LOWER EXTREMITY: ICD-10-CM

## 2024-03-11 LAB
OHS CV RIGHT ABI LOWER EXTREMITY (NO CALC): 1.4
RIGHT ANT TIBIAL SYS PSV: 36 CM/S
RIGHT CFA PSV: 71 CM/S
RIGHT EXTERNAL ILLIAC PSV: 125 CM/S
RIGHT PERONEAL SYS PSV: 35 CM/S
RIGHT POPLITEAL PSV: 36 CM/S
RIGHT POST TIBIAL SYS PSV: 50 CM/S
RIGHT PROFUNDA SYS PSV: 47 CM/S
RIGHT SUPER FEMORAL DIST SYS PSV: 60 CM/S
RIGHT SUPER FEMORAL MID SYS PSV: 73 CM/S
RIGHT SUPER FEMORAL OSTIAL SYS PSV: 74 CM/S
RIGHT SUPER FEMORAL PROX SYS PSV: 83 CM/S
RIGHT TIB/PER TRUNK SYS PSV: 36 CM/S

## 2024-03-11 PROCEDURE — 93926 LOWER EXTREMITY STUDY: CPT | Mod: 26,RT,, | Performed by: INTERNAL MEDICINE

## 2024-03-11 PROCEDURE — 93926 LOWER EXTREMITY STUDY: CPT | Mod: RT

## 2024-03-12 ENCOUNTER — CLINICAL SUPPORT (OUTPATIENT)
Dept: REHABILITATION | Facility: HOSPITAL | Age: 59
End: 2024-03-12
Payer: COMMERCIAL

## 2024-03-12 DIAGNOSIS — M62.81 WEAKNESS OF TRUNK MUSCULATURE: ICD-10-CM

## 2024-03-12 DIAGNOSIS — M79.604 LUMBAR PAIN WITH RADIATION DOWN RIGHT LEG: Primary | ICD-10-CM

## 2024-03-12 DIAGNOSIS — M54.50 LUMBAR PAIN WITH RADIATION DOWN RIGHT LEG: Primary | ICD-10-CM

## 2024-03-12 DIAGNOSIS — M54.30 SCIATIC LEG PAIN: ICD-10-CM

## 2024-03-12 PROCEDURE — 97161 PT EVAL LOW COMPLEX 20 MIN: CPT

## 2024-03-12 PROCEDURE — 97110 THERAPEUTIC EXERCISES: CPT

## 2024-03-12 NOTE — PLAN OF CARE
[facility]   Physical Therapy Initial Evaluation      Name: Orlin Zamarripa  Clinic Number: [id number]    Therapy Diagnosis:   Encounter Diagnosis   Name Primary?    Sciatic leg pain         Physician: Dipti Chavez,*    Physician Orders: PT Eval and Treat   Medical Diagnosis from Referral: M54.30 (ICD-10-CM) - Sciatic leg pain   Evaluation Date: 3/12/2024  Authorization Period Expiration: 12/31/24  Plan of Care Expiration: 6/12/24  Progress Note Due: 4/12/24  Visit # / Visits authorized: 1/1  FOTO: 1/ 3    Precautions:  HTN, CAD  , HepC    Time In: 9am  Time Out: 9:40am  Total Appointment Time (timed & untimed codes): 40 minutes    Subjective     Date of onset: 5 months ago     History of current condition - Orlin reports: he has numbness in right lower extremity for the past 5 months. It used to be painful, but that is better. The symptoms radiate to his R foot. He performs push-ups daily, but no other formal exercises. He plans to perform HEP and call PT later if he would like to continue with further visits.     Falls: none    Imaging: Xray: FINDINGS:  Mild DJD.  The disc spaces are slightly narrowed between L4 and S1 vertebral segments.  No fracture, spondylolisthesis or bone destruction identified    Prior Therapy: none   Social History:  lives with their spouse in [facility]   Occupation: [employer]; sits in chair with back support   Prior Level of Function: (I)  Current Level of Function: (I)    Pain:  Current 0/10, worst 4/10, best 0/10   Location: left gluteal to foot    Description: numbness & aching pain, intermittent   Aggravating Factors: Standing  Easing Factors:  walking, sitting    Patients goals: be able to stand/walk without pain; be able to fish without pain      Medical History:   Past Medical History:   Diagnosis Date    Hypertension     Mixed hyperlipidemia     Portal hypertension 9/6/2023       Surgical History:   Orlin Zamarripa  has a past surgical history that  includes Cardiac catheterization (03/20/2023); Coronary angiography (Right, 3/20/2023); stent, drug eluting, multi vessel, coronary (Left, 5/8/2023); ivus, coronary (5/8/2023); Left heart catheterization (Left, 5/8/2023); Coronary angiography (N/A, 5/8/2023); instantaneous wave-free ratio (ifr) (N/A, 5/8/2023); repair, chronic total occlusion, coronary (N/A, 7/10/2023); ivus, coronary (7/10/2023); ANGIOGRAM, CORONARY, WITH LEFT HEART CATHETERIZATION (7/10/2023); stent, drug eluting, single vessel, coronary (7/10/2023); and Esophagogastroduodenoscopy (N/A, 2/5/2024).    Medications:   Orlin has a current medication list which includes the following prescription(s): amlodipine, aspirin, atorvastatin, bismuth subsalicylate, clopidogrel, doxycycline, ergocalciferol, metoprolol tartrate, metronidazole, nitroglycerin, and pantoprazole, and the following Facility-Administered Medications: sodium chloride 0.9% and sodium chloride 0.9%.    Allergies:   Review of patient's allergies indicates:  No Known Allergies     Objective        POSTURE    Postural examination/scapula alignment: Slouched posture      NEUROLOGICAL SCREENING     Sensory deficit: WFL BLE light touch, intermittent numbness reported during session       Range of Motion/Strength:       Hip Right MMT Left MMT Pain/Dysfunction with Movement (pain=!)   AROM        flexion  WFL 4+/5  WFL 4+/5    extension  WFL   WFL  Able to perform fair bridge without pain    abduction  WFL 4/5  WFL 4/5    Internal rotation  WFL 4/5  WFL 4/5    External rotation  25% limited 4/5  25% limited 4/5      Knee Right MMT Left MMT Pain/Dysfunction with Movement (pain=!)   AROM        flexion  WFL 4+/5  WFL 4+/5    extension  WFL 4+/5  WFL 4+/5        Flexibility: 90/90 hamstring: L WFL, R lacking 10 deg     Special Tests: straight leg raise (R) neg     Gait Analysis:Without AD  Deviations: non-antalgic, steady     Intake Outcome Measure for FOTO Lower Leg Survey    Therapist reviewed FOTO  scores for Orlin V Marilou on 3/12/2024.   FOTO documents entered into [employer] - see Media section or FOTO account episode details.     Intake Score: 64%         Treatment     Total Treatment time (time-based codes) separate from Evaluation: 10 minutes     Orlin received the treatments listed below:      therapeutic exercises to develop strength, endurance, ROM, and flexibility for 10 minutes including:  HEP instruction & return demonstration:   [x] prone on elbows  [x] prone press ups  [x] piriformis stretch  [x] sciatic nerve glides   []  []          Patient Education and Home Exercises     Education provided:   - HEP    Written Home Exercises Provided: yes. Exercises were reviewed and Orlin was able to demonstrate them prior to the end of the session.  Orlin demonstrated good  understanding of the education provided. See EMR under Patient Instructions for exercises provided during therapy sessions.    Assessment     Orlin is a 58 y.o. male referred to outpatient Physical Therapy with a medical diagnosis of sciatic leg pain. Patient presents with limited lumbosacral and hip mobility and posterior chain flexibility, core weakness and right lower extremity radicular symptoms affecting his functional mobility. Pt reported feeling looser in R hip following piriformis stretch and nerve glides.     Patient prognosis is Good.   Patient will benefit from skilled outpatient Physical Therapy to address the deficits stated above and in the chart below, provide patient /family education, and to maximize patientt's level of independence.     Plan of care discussed with patient: Yes  Patient's spiritual, cultural and educational needs considered and patient is agreeable to the plan of care and goals as stated below:     Anticipated Barriers for therapy: none  Medical Necessity is demonstrated by the following  History  Co-morbidities and personal factors that may impact the plan of care [] LOW: no personal factors / co-morbidities  [x]  MODERATE: 1-2 personal factors / co-morbidities  [] HIGH: 3+ personal factors / co-morbidities    Moderate / High Support Documentation:   Co-morbidities affecting plan of care: Hep C, cirrhosis, HTN, thrombocytopenia, CAD    Personal Factors:   coping style  lifestyle     Examination  Body Structures and Functions, activity limitations and participation restrictions that may impact the plan of care [x] LOW: addressing 1-2 elements  [] MODERATE: 3+ elements  [] HIGH: 4+ elements (please support below)    Moderate / High Support Documentation: impacts ability to stand for longer than 15 min, perform work duties, and fish for recreation      Clinical Presentation [x] LOW: stable  [] MODERATE: Evolving  [] HIGH: Unstable     Decision Making/ Complexity Score: low       Goals:  Short Term Goals: 4 visits  1. Pt will be (I) /c HEP to supplement PT in order to improve functional tasks  2. Pt will improve R hamstring flexibility to = left lower extremity for improved flexibility with functional bending activities  3. Pt will report centralization of right lower extremity symptoms to R gluteal in order to reduce symptoms with prolonged standing/walking     Long Term Goals: 8 visits   1. Pt will demonstrate good TrA activation with standing activities for reduced radicular pain with fishing   2. Pt will improve FOTO score to </= 79% to reduce perceived pain with mobility  3. Pt will be able to stand/walk for at least 1 hour without right lower extremity pain/numbness    Plan     Plan of care Certification: 3/12/2024 to 6/12/24.    Outpatient Physical Therapy 1-2 times weekly for 8 visits to include the following interventions: Manual Therapy, Moist Heat/ Ice, Neuromuscular Re-ed, Patient Education, Therapeutic Activities, and Therapeutic Exercise, E-stim and Dry Needling as needed.     April Iniguez, PT

## 2024-03-13 ENCOUNTER — TELEPHONE (OUTPATIENT)
Dept: CARDIOLOGY | Facility: CLINIC | Age: 59
End: 2024-03-13
Payer: COMMERCIAL

## 2024-03-13 LAB
LABCORP MISC TEST CODE: NORMAL
LABCORP MISC TEST NAME: NORMAL
LABCORP MISCELLANEOUS TEST: NORMAL

## 2024-03-13 NOTE — TELEPHONE ENCOUNTER
Contact with patient -  per Dr Hinton -  notification of no evidence of PAD seen on arterial duplex study. Recommendation to continue follow up with back and spine appointment as scheduled.  Pt voiced understanding.  Call ended.

## 2024-04-30 ENCOUNTER — TELEPHONE (OUTPATIENT)
Dept: CARDIOLOGY | Facility: CLINIC | Age: 59
End: 2024-04-30
Payer: COMMERCIAL

## 2024-04-30 DIAGNOSIS — I25.118 CORONARY ARTERY DISEASE OF NATIVE ARTERY OF NATIVE HEART WITH STABLE ANGINA PECTORIS: ICD-10-CM

## 2024-04-30 RX ORDER — CLOPIDOGREL BISULFATE 75 MG/1
TABLET ORAL
Qty: 30 TABLET | Refills: 11 | Status: SHIPPED | OUTPATIENT
Start: 2024-04-30

## 2024-04-30 NOTE — TELEPHONE ENCOUNTER
SPOUSE CALLED FOR QUESTIONS WITH PLAVIX.  PT HAS BEEN ON THE PLAVIX FOR ONE YEAR AND GOING OUT OF [place] 10[place] FOR 1 MONTH.  QUESTIONS ON IF PATIENT NEEDS TO CONTINUE AND IF REFILLS ARE NEEDED.  SPOUSE REQUEST TO SPEAK WITH DR DELACRUZ,  FORWARDED MESSAGE TO MD FOR REVIEW.

## 2024-04-30 NOTE — TELEPHONE ENCOUNTER
Notification to spouse - patient is to continue PLAVIX.  Renewal request for medication routed to Dr Hinton for review to [facility] [place].  Previous script expires April 2023.  Spouse to contact office once return to [place] for follow up appointment with Dr Hinton.

## 2024-05-03 ENCOUNTER — HOSPITAL ENCOUNTER (EMERGENCY)
Facility: HOSPITAL | Age: 59
Discharge: HOME | End: 2024-05-03
Attending: EMERGENCY MEDICINE
Payer: COMMERCIAL

## 2024-05-03 VITALS
DIASTOLIC BLOOD PRESSURE: 78 MMHG | BODY MASS INDEX: 28.25 KG/M2 | HEIGHT: 67 IN | WEIGHT: 180 LBS | RESPIRATION RATE: 16 BRPM | HEART RATE: 74 BPM | TEMPERATURE: 99 F | OXYGEN SATURATION: 99 % | SYSTOLIC BLOOD PRESSURE: 148 MMHG

## 2024-05-03 DIAGNOSIS — L03.113 CELLULITIS OF RIGHT UPPER EXTREMITY: ICD-10-CM

## 2024-05-03 DIAGNOSIS — M79.89 ARM SWELLING: Primary | ICD-10-CM

## 2024-05-03 LAB
ALBUMIN SERPL BCP-MCNC: 4.3 G/DL (ref 3.5–5.2)
ALP SERPL-CCNC: 77 U/L (ref 55–135)
ALT SERPL W/O P-5'-P-CCNC: 41 U/L (ref 10–44)
ANION GAP SERPL CALC-SCNC: 10 MMOL/L (ref 8–16)
AST SERPL-CCNC: 26 U/L (ref 10–40)
BASOPHILS # BLD AUTO: 0.01 K/UL (ref 0–0.2)
BASOPHILS NFR BLD: 0.2 % (ref 0–1.9)
BILIRUB SERPL-MCNC: 0.7 MG/DL (ref 0.1–1)
BUN SERPL-MCNC: 13 MG/DL (ref 6–20)
CALCIUM SERPL-MCNC: 8.9 MG/DL (ref 8.7–10.5)
CHLORIDE SERPL-SCNC: 105 MMOL/L (ref 95–110)
CO2 SERPL-SCNC: 24 MMOL/L (ref 23–29)
CREAT SERPL-MCNC: 1.1 MG/DL (ref 0.5–1.4)
DIFFERENTIAL METHOD BLD: ABNORMAL
EOSINOPHIL # BLD AUTO: 0.1 K/UL (ref 0–0.5)
EOSINOPHIL NFR BLD: 1.9 % (ref 0–8)
ERYTHROCYTE [DISTWIDTH] IN BLOOD BY AUTOMATED COUNT: 12.1 % (ref 11.5–14.5)
EST. GFR  (NO RACE VARIABLE): >60 ML/MIN/1.73 M^2
GLUCOSE SERPL-MCNC: 181 MG/DL (ref 70–110)
HCT VFR BLD AUTO: 41.1 % (ref 40–54)
HGB BLD-MCNC: 14.5 G/DL (ref 14–18)
IMM GRANULOCYTES # BLD AUTO: 0.01 K/UL (ref 0–0.04)
IMM GRANULOCYTES NFR BLD AUTO: 0.2 % (ref 0–0.5)
LACTATE SERPL-SCNC: 1.8 MMOL/L (ref 0.5–2.2)
LYMPHOCYTES # BLD AUTO: 1.5 K/UL (ref 1–4.8)
LYMPHOCYTES NFR BLD: 28.8 % (ref 18–48)
MAGNESIUM SERPL-MCNC: 2 MG/DL (ref 1.6–2.6)
MCH RBC QN AUTO: 33 PG (ref 27–31)
MCHC RBC AUTO-ENTMCNC: 35.3 G/DL (ref 32–36)
MCV RBC AUTO: 94 FL (ref 82–98)
MONOCYTES # BLD AUTO: 0.4 K/UL (ref 0.3–1)
MONOCYTES NFR BLD: 6.9 % (ref 4–15)
NEUTROPHILS # BLD AUTO: 3.2 K/UL (ref 1.8–7.7)
NEUTROPHILS NFR BLD: 62 % (ref 38–73)
NRBC BLD-RTO: 0 /100 WBC
PLATELET # BLD AUTO: 141 K/UL (ref 150–450)
PMV BLD AUTO: 9.2 FL (ref 9.2–12.9)
POTASSIUM SERPL-SCNC: 3.6 MMOL/L (ref 3.5–5.1)
PROT SERPL-MCNC: 8 G/DL (ref 6–8.4)
RBC # BLD AUTO: 4.39 M/UL (ref 4.6–6.2)
SODIUM SERPL-SCNC: 139 MMOL/L (ref 136–145)
WBC # BLD AUTO: 5.2 K/UL (ref 3.9–12.7)

## 2024-05-03 PROCEDURE — 63600175 PHARM REV CODE 636 W HCPCS: Performed by: EMERGENCY MEDICINE

## 2024-05-03 PROCEDURE — 83735 ASSAY OF MAGNESIUM: CPT | Performed by: EMERGENCY MEDICINE

## 2024-05-03 PROCEDURE — 99285 EMERGENCY DEPT VISIT HI MDM: CPT | Mod: 25

## 2024-05-03 PROCEDURE — 87040 BLOOD CULTURE FOR BACTERIA: CPT | Mod: 59 | Performed by: EMERGENCY MEDICINE

## 2024-05-03 PROCEDURE — 96365 THER/PROPH/DIAG IV INF INIT: CPT

## 2024-05-03 PROCEDURE — 83605 ASSAY OF LACTIC ACID: CPT | Performed by: EMERGENCY MEDICINE

## 2024-05-03 PROCEDURE — 80053 COMPREHEN METABOLIC PANEL: CPT | Performed by: EMERGENCY MEDICINE

## 2024-05-03 PROCEDURE — 96372 THER/PROPH/DIAG INJ SC/IM: CPT | Mod: 59 | Performed by: EMERGENCY MEDICINE

## 2024-05-03 PROCEDURE — 85025 COMPLETE CBC W/AUTO DIFF WBC: CPT | Performed by: EMERGENCY MEDICINE

## 2024-05-03 RX ORDER — CLINDAMYCIN HYDROCHLORIDE 150 MG/1
300 CAPSULE ORAL 4 TIMES DAILY
Qty: 56 CAPSULE | Refills: 0 | Status: SHIPPED | OUTPATIENT
Start: 2024-05-03 | End: 2024-05-10

## 2024-05-03 RX ORDER — CLINDAMYCIN PHOSPHATE 600 MG/50ML
600 INJECTION, SOLUTION INTRAVENOUS
Status: COMPLETED | OUTPATIENT
Start: 2024-05-03 | End: 2024-05-03

## 2024-05-03 RX ORDER — DICLOFENAC SODIUM 75 MG/1
75 TABLET, DELAYED RELEASE ORAL 2 TIMES DAILY
Qty: 14 TABLET | Refills: 0 | Status: SHIPPED | OUTPATIENT
Start: 2024-05-03

## 2024-05-03 RX ORDER — KETOROLAC TROMETHAMINE 30 MG/ML
15 INJECTION, SOLUTION INTRAMUSCULAR; INTRAVENOUS
Status: COMPLETED | OUTPATIENT
Start: 2024-05-03 | End: 2024-05-03

## 2024-05-03 RX ADMIN — KETOROLAC TROMETHAMINE 15 MG: 30 INJECTION, SOLUTION INTRAMUSCULAR at 10:05

## 2024-05-03 RX ADMIN — CLINDAMYCIN PHOSPHATE 600 MG: 600 INJECTION, SOLUTION INTRAVENOUS at 10:05

## 2024-05-04 NOTE — ED PROVIDER NOTES
Encounter Date: 5/3/2024       History     Chief Complaint   Patient presents with    Arm Swelling     R arm swelling started yesterday, no recent trauma noted to arm      Patient presents emergency department with reported right arm pain swelling that started yesterday and worsened today discomfort as mild there is some increased warmth to the areas well no recorded fever chills at home no nausea vomiting no antecedent illness he denies any injury to the area recent blood draws or procedures done he does have a history of cirrhosis but apparently his liver functions very stable at this time is no history of blood clots he has had cardiac stents in the past in his on Plavix and aspirin        Review of patient's allergies indicates:  No Known Allergies  Past Medical History:   Diagnosis Date    Hypertension     Mixed hyperlipidemia     Portal hypertension 9/6/2023     Past Surgical History:   Procedure Laterality Date    ANGIOGRAM, CORONARY, WITH LEFT HEART CATHETERIZATION  7/10/2023    Procedure: Angiogram, Coronary, with Left Heart Cath;  Surgeon: Jose Hinton MD;  Location: [facility] CATH LAB;  Service: Cardiology;;    CARDIAC CATHETERIZATION  03/20/2023    CORONARY ANGIOGRAPHY Right 3/20/2023    Procedure: ANGIOGRAM, CORONARY ARTERY;  Surgeon: Esequiel Zamarripa MD;  Location: [facility] CATH LAB;  Service: Cardiology;  Laterality: Right;  radial    CORONARY ANGIOGRAPHY N/A 5/8/2023    Procedure: ANGIOGRAM, CORONARY ARTERY;  Surgeon: Jose Hinton MD;  Location: [facility] CATH LAB;  Service: Cardiology;  Laterality: N/A;    ESOPHAGOGASTRODUODENOSCOPY N/A 2/5/2024    Procedure: EGD (ESOPHAGOGASTRODUODENOSCOPY);  Surgeon: Ivanna Ledbetter MD;  Location: [facility] ENDO ([address] [place] [address]);  Service: Endoscopy;  Laterality: N/A;  instructions to portal. Banding. labs 12/6  Referral: Dr. Lynn  10-16-to be done on Plavix-see encounter dated 9/7-MS  10/23 sent patient to [facility] for reschedule due to patient not holding plavix for 5  days. patient shikha for Feb pending Plavix hold    INSTANTANEOUS WAVE-FREE RATIO (IFR) N/A 2023    Procedure: Instantaneous Wave-Free Ratio (IFR);  Surgeon: Jose Hinton MD;  Location: [facility] CATH LAB;  Service: Cardiology;  Laterality: N/A;    IVUS, CORONARY  2023    Procedure: IVUS, Coronary;  Surgeon: Jose Hinton MD;  Location: [facility] CATH LAB;  Service: Cardiology;;    IVUS, CORONARY  7/10/2023    Procedure: IVUS, Coronary;  Surgeon: Jose Hinton MD;  Location: [facility] CATH LAB;  Service: Cardiology;;    LEFT HEART CATHETERIZATION Left 2023    Procedure: Left heart cath;  Surgeon: Jose Hinton MD;  Location: [facility] CATH LAB;  Service: Cardiology;  Laterality: Left;    REPAIR, CHRONIC TOTAL OCCLUSION, CORONARY N/A 7/10/2023    Procedure: Repair, Chronic Total Occlusion, Coronary;  Surgeon: Jose Hinton MD;  Location: [facility] CATH LAB;  Service: Cardiology;  Laterality: N/A;    STENT, DRUG ELUTING, MULTI VESSEL, CORONARY Left 2023    Procedure: Stent, Drug Eluting, Multi Vessel, Coronary;  Surgeon: Jose Hinton MD;  Location: [facility] CATH LAB;  Service: Cardiology;  Laterality: Left;  low bleeding risk 2.2%    STENT, DRUG ELUTING, SINGLE VESSEL, CORONARY  7/10/2023    Procedure: Stent, Drug Eluting, Single Vessel, Coronary;  Surgeon: Jose Hinton MD;  Location: [facility] CATH LAB;  Service: Cardiology;;     No family history on file.  Social History     Tobacco Use    Smoking status: Former     Current packs/day: 0.00     Types: Cigarettes     Quit date: 2020     Years since quittin.0    Smokeless tobacco: Never   Substance Use Topics    Alcohol use: Yes     Alcohol/week: 2.0 standard drinks of alcohol     Types: 1 Glasses of wine, 1 Cans of beer per week     Comment: rare    Drug use: Never     Review of Systems   Constitutional:  Negative for chills and fever.   Musculoskeletal:         Right arm swelling   Skin:  Positive for color change. Negative for  wound.   All other systems reviewed and are negative.      Physical Exam     Initial Vitals [05/03/24 2007]   BP Pulse Resp Temp SpO2   (!) 153/81 79 18 98.7 °F (37.1 °C) 98 %      MAP       --         Physical Exam    Constitutional: He appears well-developed and well-nourished.   HENT:   Head: Normocephalic and atraumatic.   Right Ear: External ear normal.   Left Ear: External ear normal.   Eyes: Pupils are equal, round, and reactive to light.   Neck: Neck supple.   Normal range of motion.  Cardiovascular:  Normal rate, regular rhythm and normal heart sounds.           Pulmonary/Chest: Breath sounds normal.   Abdominal: Abdomen is soft. Bowel sounds are normal. There is abdominal tenderness.   Musculoskeletal:         General: Edema present. No tenderness. Normal range of motion.      Cervical back: Normal range of motion and neck supple.      Comments: Swelling and mild erythema noted to the right forearm elbow distally no fluctuance mild rubor no adnexal lymphadenopathy     Neurological: He is alert and oriented to person, place, and time. He has normal strength. GCS score is 15. GCS eye subscore is 4. GCS verbal subscore is 5. GCS motor subscore is 6.   Skin: Skin is warm and dry. There is erythema.         ED Course   Procedures  Labs Reviewed   CBC W/ AUTO DIFFERENTIAL - Abnormal; Notable for the following components:       Result Value    RBC 4.39 (*)     MCH 33.0 (*)     Platelets 141 (*)     All other components within normal limits   COMPREHENSIVE METABOLIC PANEL - Abnormal; Notable for the following components:    Glucose 181 (*)     All other components within normal limits   CULTURE, BLOOD   CULTURE, BLOOD   MAGNESIUM   LACTIC ACID, PLASMA          Imaging Results              US Upper Extremity Veins Right (In process)                      Medications   clindamycin in D5W 600 mg/50 mL IVPB 600 mg (600 mg Intravenous New Bag 5/3/24 4000)   ketorolac injection 15 mg (has no administration in time range)      Medical Decision Making  Laboratory evaluation reviewed ultrasound shows no evidence of deep vein thrombosis patient's symptoms are most consistent with likely inflammatory bursitis however given the increased warmth in his history of cirrhosis will also provide patient with antibiotics I have given a dose of clindamycin emergency department Toradol will continue diclofenac and clindamycin at home return to ER for any worsened symptoms or new symptoms outpatient follow-up with both his primary doctor and orthopedics for further prior to them going out of the country next week to ascertain resolution of his symptoms     Amount and/or Complexity of Data Reviewed  Labs: ordered. Decision-making details documented in ED Course.  Radiology: ordered. Decision-making details documented in ED Course.    Risk  Prescription drug management.                                      Clinical Impression:  Final diagnoses:  [M79.89] Arm swelling (Primary)  [L03.113] Cellulitis of right upper extremity          ED Disposition Condition    Discharge Stable          ED Prescriptions       Medication Sig Dispense Start Date End Date Auth. Provider    clindamycin (CLEOCIN) 150 MG capsule Take 2 capsules (300 mg total) by mouth 4 (four) times daily. for 7 days 56 capsule 5/3/2024 5/10/2024 Esteban Peña MD    diclofenac (VOLTAREN) 75 MG EC tablet Take 1 tablet (75 mg total) by mouth 2 (two) times daily. 14 tablet 5/3/2024 -- Esteban Peña MD          Follow-up Information       Follow up With Specialties Details Why Contact Info    Berna Lazcano, FNP-C Family Medicine Call in 1 day for re-examination of your symptoms [id number] [facility] [address] [facility] [id number] [phone]      Ariel Marquez MD Orthopedic Surgery, Surgery, Sports Medicine Call in 2 days for further evaluation and treatment [address] [facility]    [place] [address]  [phone]               Esteban Peña MD  05/03/24  3715

## 2024-05-09 LAB
BACTERIA BLD CULT: NORMAL
BACTERIA BLD CULT: NORMAL

## 2024-06-13 ENCOUNTER — LAB VISIT (OUTPATIENT)
Dept: LAB | Facility: HOSPITAL | Age: 59
End: 2024-06-13
Attending: INTERNAL MEDICINE
Payer: COMMERCIAL

## 2024-06-13 DIAGNOSIS — K74.69 OTHER CIRRHOSIS OF LIVER: ICD-10-CM

## 2024-06-13 LAB
ALBUMIN SERPL BCP-MCNC: 4.5 G/DL (ref 3.5–5.2)
ALBUMIN SERPL BCP-MCNC: 4.5 G/DL (ref 3.5–5.2)
ALP SERPL-CCNC: 61 U/L (ref 55–135)
ALP SERPL-CCNC: 61 U/L (ref 55–135)
ALT SERPL W/O P-5'-P-CCNC: 58 U/L (ref 10–44)
ALT SERPL W/O P-5'-P-CCNC: 58 U/L (ref 10–44)
ANION GAP SERPL CALC-SCNC: 6 MMOL/L (ref 8–16)
ANION GAP SERPL CALC-SCNC: 6 MMOL/L (ref 8–16)
AST SERPL-CCNC: 40 U/L (ref 10–40)
AST SERPL-CCNC: 40 U/L (ref 10–40)
BASOPHILS # BLD AUTO: 0.01 K/UL (ref 0–0.2)
BASOPHILS NFR BLD: 0.3 % (ref 0–1.9)
BILIRUB DIRECT SERPL-MCNC: 0.2 MG/DL (ref 0.1–0.3)
BILIRUB DIRECT SERPL-MCNC: 0.2 MG/DL (ref 0.1–0.3)
BILIRUB SERPL-MCNC: 1.1 MG/DL (ref 0.1–1)
BILIRUB SERPL-MCNC: 1.1 MG/DL (ref 0.1–1)
BUN SERPL-MCNC: 20 MG/DL (ref 6–20)
BUN SERPL-MCNC: 20 MG/DL (ref 6–20)
CALCIUM SERPL-MCNC: 9 MG/DL (ref 8.7–10.5)
CALCIUM SERPL-MCNC: 9 MG/DL (ref 8.7–10.5)
CHLORIDE SERPL-SCNC: 106 MMOL/L (ref 95–110)
CHLORIDE SERPL-SCNC: 106 MMOL/L (ref 95–110)
CO2 SERPL-SCNC: 26 MMOL/L (ref 23–29)
CO2 SERPL-SCNC: 26 MMOL/L (ref 23–29)
CREAT SERPL-MCNC: 0.9 MG/DL (ref 0.5–1.4)
CREAT SERPL-MCNC: 0.9 MG/DL (ref 0.5–1.4)
DIFFERENTIAL METHOD BLD: ABNORMAL
EOSINOPHIL # BLD AUTO: 0.1 K/UL (ref 0–0.5)
EOSINOPHIL NFR BLD: 1.4 % (ref 0–8)
ERYTHROCYTE [DISTWIDTH] IN BLOOD BY AUTOMATED COUNT: 13 % (ref 11.5–14.5)
EST. GFR  (NO RACE VARIABLE): >60 ML/MIN/1.73 M^2
EST. GFR  (NO RACE VARIABLE): >60 ML/MIN/1.73 M^2
GLUCOSE SERPL-MCNC: 98 MG/DL (ref 70–110)
GLUCOSE SERPL-MCNC: 98 MG/DL (ref 70–110)
HCT VFR BLD AUTO: 42.6 % (ref 40–54)
HGB BLD-MCNC: 14.6 G/DL (ref 14–18)
IMM GRANULOCYTES # BLD AUTO: 0 K/UL (ref 0–0.04)
IMM GRANULOCYTES NFR BLD AUTO: 0 % (ref 0–0.5)
INR PPP: 1 (ref 0.8–1.2)
INR PPP: 1 (ref 0.8–1.2)
LYMPHOCYTES # BLD AUTO: 1.4 K/UL (ref 1–4.8)
LYMPHOCYTES NFR BLD: 39.3 % (ref 18–48)
MCH RBC QN AUTO: 32.2 PG (ref 27–31)
MCHC RBC AUTO-ENTMCNC: 34.3 G/DL (ref 32–36)
MCV RBC AUTO: 94 FL (ref 82–98)
MONOCYTES # BLD AUTO: 0.3 K/UL (ref 0.3–1)
MONOCYTES NFR BLD: 9.4 % (ref 4–15)
NEUTROPHILS # BLD AUTO: 1.7 K/UL (ref 1.8–7.7)
NEUTROPHILS NFR BLD: 49.6 % (ref 38–73)
NRBC BLD-RTO: 0 /100 WBC
PLATELET # BLD AUTO: 171 K/UL (ref 150–450)
PMV BLD AUTO: 8.9 FL (ref 9.2–12.9)
POTASSIUM SERPL-SCNC: 4.2 MMOL/L (ref 3.5–5.1)
POTASSIUM SERPL-SCNC: 4.2 MMOL/L (ref 3.5–5.1)
PROT SERPL-MCNC: 8.1 G/DL (ref 6–8.4)
PROT SERPL-MCNC: 8.1 G/DL (ref 6–8.4)
PROTHROMBIN TIME: 11.2 SEC (ref 9–12.5)
PROTHROMBIN TIME: 11.2 SEC (ref 9–12.5)
RBC # BLD AUTO: 4.53 M/UL (ref 4.6–6.2)
SODIUM SERPL-SCNC: 138 MMOL/L (ref 136–145)
SODIUM SERPL-SCNC: 138 MMOL/L (ref 136–145)
WBC # BLD AUTO: 3.51 K/UL (ref 3.9–12.7)

## 2024-06-13 PROCEDURE — 85025 COMPLETE CBC W/AUTO DIFF WBC: CPT | Performed by: INTERNAL MEDICINE

## 2024-06-13 PROCEDURE — 80053 COMPREHEN METABOLIC PANEL: CPT | Performed by: INTERNAL MEDICINE

## 2024-06-13 PROCEDURE — 82248 BILIRUBIN DIRECT: CPT | Performed by: INTERNAL MEDICINE

## 2024-06-13 PROCEDURE — 85610 PROTHROMBIN TIME: CPT | Performed by: INTERNAL MEDICINE

## 2024-06-13 PROCEDURE — 30000890 LABCORP MISCELLANEOUS TEST: Performed by: INTERNAL MEDICINE

## 2024-06-13 PROCEDURE — 80321 ALCOHOLS BIOMARKERS 1OR 2: CPT | Performed by: INTERNAL MEDICINE

## 2024-06-13 PROCEDURE — 36415 COLL VENOUS BLD VENIPUNCTURE: CPT | Performed by: INTERNAL MEDICINE

## 2024-06-18 ENCOUNTER — HOSPITAL ENCOUNTER (OUTPATIENT)
Dept: RADIOLOGY | Facility: HOSPITAL | Age: 59
Discharge: HOME | End: 2024-06-18
Attending: INTERNAL MEDICINE
Payer: COMMERCIAL

## 2024-06-18 DIAGNOSIS — K74.69 OTHER CIRRHOSIS OF LIVER: ICD-10-CM

## 2024-06-18 PROCEDURE — 76700 US EXAM ABDOM COMPLETE: CPT | Mod: 26,,, | Performed by: RADIOLOGY

## 2024-06-18 PROCEDURE — 76700 US EXAM ABDOM COMPLETE: CPT | Mod: TC,PO

## 2024-06-21 LAB
LABCORP MISC TEST CODE: NORMAL
LABCORP MISC TEST NAME: NORMAL
LABCORP MISCELLANEOUS TEST: NORMAL

## 2024-06-27 ENCOUNTER — TELEPHONE (OUTPATIENT)
Dept: CARDIOLOGY | Facility: CLINIC | Age: 59
End: 2024-06-27
Payer: COMMERCIAL

## 2024-06-27 NOTE — TELEPHONE ENCOUNTER
Successful contact with patient c.g./spouse --  request to move appointment.  Assisted to complete the appointment.

## 2024-08-15 ENCOUNTER — OFFICE VISIT (OUTPATIENT)
Dept: CARDIOLOGY | Facility: CLINIC | Age: 59
End: 2024-08-15
Payer: COMMERCIAL

## 2024-08-15 ENCOUNTER — TELEPHONE (OUTPATIENT)
Dept: CARDIOLOGY | Facility: CLINIC | Age: 59
End: 2024-08-15

## 2024-08-15 VITALS
BODY MASS INDEX: 27.16 KG/M2 | DIASTOLIC BLOOD PRESSURE: 77 MMHG | OXYGEN SATURATION: 97 % | WEIGHT: 173.06 LBS | SYSTOLIC BLOOD PRESSURE: 132 MMHG | HEIGHT: 67 IN | HEART RATE: 83 BPM

## 2024-08-15 DIAGNOSIS — I10 PRIMARY HYPERTENSION: Primary | ICD-10-CM

## 2024-08-15 DIAGNOSIS — Z87.891 EX-SMOKER: ICD-10-CM

## 2024-08-15 DIAGNOSIS — D69.6 THROMBOCYTOPENIA: ICD-10-CM

## 2024-08-15 DIAGNOSIS — I25.10 CORONARY ARTERY DISEASE INVOLVING NATIVE CORONARY ARTERY OF NATIVE HEART WITHOUT ANGINA PECTORIS: ICD-10-CM

## 2024-08-15 PROCEDURE — 99214 OFFICE O/P EST MOD 30 MIN: CPT | Mod: S$GLB,,, | Performed by: INTERNAL MEDICINE

## 2024-08-15 PROCEDURE — 3008F BODY MASS INDEX DOCD: CPT | Mod: CPTII,S$GLB,, | Performed by: INTERNAL MEDICINE

## 2024-08-15 PROCEDURE — 1159F MED LIST DOCD IN RCRD: CPT | Mod: CPTII,S$GLB,, | Performed by: INTERNAL MEDICINE

## 2024-08-15 PROCEDURE — 99999 PR PBB SHADOW E&M-EST. PATIENT-LVL IV: CPT | Mod: PBBFAC,,, | Performed by: INTERNAL MEDICINE

## 2024-08-15 PROCEDURE — 3078F DIAST BP <80 MM HG: CPT | Mod: CPTII,S$GLB,, | Performed by: INTERNAL MEDICINE

## 2024-08-15 PROCEDURE — 3075F SYST BP GE 130 - 139MM HG: CPT | Mod: CPTII,S$GLB,, | Performed by: INTERNAL MEDICINE

## 2024-08-15 NOTE — TELEPHONE ENCOUNTER
In basket message to staff pool  - referral completed, pt needs follow up with general cardiology in 6 months.  Staff message to office of Dr Dai to request assist with appointment.

## 2024-08-15 NOTE — PROGRESS NOTES
Subjective:    Patient ID:  Orlin Zamarripa is a 58 y.o. male who presents for follow-up of No chief complaint on file.      Referring physician: Self referred      HPI  Mr. Zamarripa is a 58 y.o. gentleman with HTN, HLD, CAD and former smoker. He had been having exertional chest pain for approximately 1 year. He had an angiogram performed at outside hospital, which revealed  to RCA, severe stenosis to mid LAD, and severe stenosis to distal left circumflex artery.  He was evaluated by Dr. Mohan for bypass surgery, however, he was deemed not to be a candidate given cirrhosis/HCV.  He underwent PCI of the LAD On 5/8/23. He underwent RCA  PCI on 7/10/23. Since then he denies any angina or [place]. He is now able to mow his entire lawn rapidly without having to stop. He denies lower extremity edema, PND, or  orthopnea. He denies palpitations, syncope, or near syncope.   He denies claudication or any wounds on his feet.     Past Medical History:   Diagnosis Date    Hypertension     Mixed hyperlipidemia     Portal hypertension 9/6/2023       Past Surgical History:   Procedure Laterality Date    ANGIOGRAM, CORONARY, WITH LEFT HEART CATHETERIZATION  7/10/2023    Procedure: Angiogram, Coronary, with Left Heart Cath;  Surgeon: Jose Hinton MD;  Location: [facility] CATH LAB;  Service: Cardiology;;    CARDIAC CATHETERIZATION  03/20/2023    CORONARY ANGIOGRAPHY Right 3/20/2023    Procedure: ANGIOGRAM, CORONARY ARTERY;  Surgeon: Esequiel Zamarripa MD;  Location: [facility] CATH LAB;  Service: Cardiology;  Laterality: Right;  radial    CORONARY ANGIOGRAPHY N/A 5/8/2023    Procedure: ANGIOGRAM, CORONARY ARTERY;  Surgeon: Jose Hinton MD;  Location: [facility] CATH LAB;  Service: Cardiology;  Laterality: N/A;    ESOPHAGOGASTRODUODENOSCOPY N/A 2/5/2024    Procedure: EGD (ESOPHAGOGASTRODUODENOSCOPY);  Surgeon: Ivanna Ledbetter MD;  Location: [facility] ENDO ([address] [place] [address]);  Service: Endoscopy;  Laterality: N/A;  instructions to portal. Banding. labs  12/6  Referral: Dr. Lynn  10-16-to be done on Plavix-see encounter dated 9/7-MS  10/23 sent patient to [facility] for reschedule due to patient not holding plavix for 5 days. patient shikha for Feb pending Plavix hold    INSTANTANEOUS WAVE-FREE RATIO (IFR) N/A 5/8/2023    Procedure: Instantaneous Wave-Free Ratio (IFR);  Surgeon: Jose Hinton MD;  Location: [facility] CATH LAB;  Service: Cardiology;  Laterality: N/A;    IVUS, CORONARY  5/8/2023    Procedure: IVUS, Coronary;  Surgeon: Jose Hinton MD;  Location: [facility] CATH LAB;  Service: Cardiology;;    IVUS, CORONARY  7/10/2023    Procedure: IVUS, Coronary;  Surgeon: Jose Hinton MD;  Location: [facility] CATH LAB;  Service: Cardiology;;    LEFT HEART CATHETERIZATION Left 5/8/2023    Procedure: Left heart cath;  Surgeon: Jose Hinton MD;  Location: [facility] CATH LAB;  Service: Cardiology;  Laterality: Left;    REPAIR, CHRONIC TOTAL OCCLUSION, CORONARY N/A 7/10/2023    Procedure: Repair, Chronic Total Occlusion, Coronary;  Surgeon: Jose Hinton MD;  Location: [facility] CATH LAB;  Service: Cardiology;  Laterality: N/A;    STENT, DRUG ELUTING, MULTI VESSEL, CORONARY Left 5/8/2023    Procedure: Stent, Drug Eluting, Multi Vessel, Coronary;  Surgeon: Jose Hinton MD;  Location: [facility] CATH LAB;  Service: Cardiology;  Laterality: Left;  low bleeding risk 2.2%    STENT, DRUG ELUTING, SINGLE VESSEL, CORONARY  7/10/2023    Procedure: Stent, Drug Eluting, Single Vessel, Coronary;  Surgeon: Jose Hinton MD;  Location: [facility] CATH LAB;  Service: Cardiology;;       Current Outpatient Medications on File Prior to Visit   Medication Sig Dispense Refill    aspirin (ECOTRIN) 81 MG EC tablet Take 81 mg by mouth once daily.      atorvastatin (LIPITOR) 20 MG tablet Take 20 mg by mouth once daily.      clopidogreL (PLAVIX) 75 mg tablet Take 4 tablets (300mg) this evening, followed by 1 tablet (75mg) thereafter. 30 tablet 11    diclofenac (VOLTAREN) 75 MG EC  tablet Take 1 tablet (75 mg total) by mouth 2 (two) times daily. 14 tablet 0    ergocalciferol (ERGOCALCIFEROL) 50,000 unit Cap Take 50,000 Units by mouth every 7 days.      [DISCONTINUED] amLODIPine (NORVASC) 5 MG tablet Take 5 mg by mouth once daily.      nitroGLYCERIN (NITROSTAT) 0.4 MG SL tablet Place 1 tablet (0.4 mg total) under the tongue every 5 (five) minutes as needed for Chest pain. (Patient not taking: Reported on 8/15/2024) 30 tablet 3    [DISCONTINUED] metoprolol tartrate (LOPRESSOR) 25 MG tablet Take 25 mg by mouth. (Patient not taking: Reported on 8/15/2024)      [DISCONTINUED] pantoprazole (PROTONIX) 40 MG tablet Take 1 tablet (40 mg total) by mouth 2 (two) times daily. for 14 days (Patient not taking: Reported on 8/15/2024) 28 tablet 0     Current Facility-Administered Medications on File Prior to Visit   Medication Dose Route Frequency Provider Last Rate Last Admin    0.9%  NaCl infusion   Intravenous Continuous Jose Hinton  mL/hr at 07/10/23 0708 New Bag at 07/10/23 0708    sodium chloride 0.9% flush 10 mL  10 mL Intravenous PRN Cristofer Hernandez MD           Review of patient's allergies indicates:  No Known Allergies    Social History     Tobacco Use    Smoking status: Former     Current packs/day: 0.00     Types: Cigarettes     Quit date: 2020     Years since quittin.3    Smokeless tobacco: Never   Substance Use Topics    Alcohol use: Yes     Alcohol/week: 2.0 standard drinks of alcohol     Types: 1 Glasses of wine, 1 Cans of beer per week     Comment: rare    Drug use: Never       No family history on file.      Review of Systems   Constitutional: Negative for decreased appetite, diaphoresis, fever, malaise/fatigue, weight gain and weight loss.   HENT:  Negative for congestion, nosebleeds and sore throat.    Eyes:  Negative for blurred vision, vision loss in left eye, vision loss in right eye and visual disturbance.   Cardiovascular:  Negative for chest pain,  "claudication, dyspnea on exertion, leg swelling, near-syncope, orthopnea, palpitations, paroxysmal nocturnal dyspnea and syncope.   Respiratory:  Negative for cough, hemoptysis, shortness of breath and wheezing.    Endocrine: Negative for polyuria.   Hematologic/Lymphatic: Does not bruise/bleed easily.   Skin:  Negative for nail changes and rash.   Musculoskeletal:  Negative for back pain, muscle cramps and myalgias.   Gastrointestinal:  Negative for abdominal pain, change in bowel habit, diarrhea, heartburn, hematemesis, hematochezia, melena, nausea and vomiting.   Genitourinary:  Negative for bladder incontinence, dysuria, frequency and hematuria.   Psychiatric/Behavioral:  Negative for depression.    Allergic/Immunologic: Negative for hives.        Objective:     Vitals:    08/15/24 0906 08/15/24 0907   BP: 133/83 132/77   BP Location: Left arm Right arm   Patient Position: Sitting Sitting   BP Method: Large (Automatic) Large (Automatic)   Pulse: 87 83   SpO2: 98% 97%   Weight: 78.5 kg (173 lb 1 oz)    Height: 5' 7" (1.702 m)         Physical Exam  Constitutional:       Appearance: Normal appearance. He is well-developed.   HENT:      Head: Normocephalic and atraumatic.   Eyes:      Pupils: Pupils are equal, round, and reactive to light.   Neck:      Thyroid: No thyromegaly.      Vascular: No JVD.   Cardiovascular:      Rate and Rhythm: Normal rate and regular rhythm.      Chest Wall: PMI is displaced.      Pulses:           Carotid pulses are 2+ on the right side and 2+ on the left side.       Radial pulses are 2+ on the right side and 2+ on the left side.        Femoral pulses are 2+ on the right side and 2+ on the left side.       Dorsalis pedis pulses are 2+ on the right side and 2+ on the left side.        Posterior tibial pulses are 2+ on the right side and 2+ on the left side.      Heart sounds: Normal heart sounds. No murmur heard.     No friction rub. No gallop.   Pulmonary:      Effort: Pulmonary effort " is normal.      Breath sounds: Normal breath sounds. No wheezing, rhonchi or rales.   Abdominal:      General: Bowel sounds are normal. There is no distension.      Palpations: Abdomen is soft.      Tenderness: There is no abdominal tenderness.   Musculoskeletal:      Cervical back: Neck supple.   Skin:     General: Skin is warm and dry.      Findings: No erythema.   Neurological:      Mental Status: He is alert and oriented to person, place, and time.      Gait: Gait normal.           Assessment:       1. Primary hypertension    2. Coronary artery disease involving native coronary artery of native heart without angina pectoris    3. Thrombocytopenia    4. Ex-smoker         Plan:       1) coronary artery disease.  The patient reports that is free of anginal and CHF symptoms.  -continue enteric-coated aspirin 81 mg p.o. q.d.  -continue Plavix 75 mg p.o. q.day  -rec continuing aerobic exercise program with goal of 60 minutes, 5 days a week  -continue heart healthy diet     2) hypertension.  The patient's blood pressure is adequately controlled in clinic today. Patient reports heis no longer taking amlodipine and metoprolol     3) dyslipidemia.  03/17/2023 lipid panel reviewed.  Continue Lipitor 20 mg p.o. q.day unless hepatology deems this should be stopped in light of the patient's liver disease     4) HCV cirrhosis.  Rec follow-up with hepatology      5) thrombocytopenia.  The patient's platelet count was 160 on 5/31/23; on 07/10/2023 8 was 123.  On 02/05/2024 it was 157. On 6/13/24 it was 171. This is likely secondary to his liver disease; recommend he follow up with hepatology    All of the patient's and his wife's questions were answered.

## (undated) DEVICE — OMNIPAQUE 350MG 150ML VIAL

## (undated) DEVICE — CATH EMERGE MR 20 X 3.00

## (undated) DEVICE — CATH ANGIO GUIDE AL75 8FRX90CM

## (undated) DEVICE — SPIKE CONTRAST CONTROLLER

## (undated) DEVICE — SHEATH INTRODUCER 6FR 11CM

## (undated) DEVICE — KIT MICROINTRO 4F .018X40X7CM

## (undated) DEVICE — HEMOSTAT VASC BAND REG 24CM

## (undated) DEVICE — PROTECTION STATION PLUS

## (undated) DEVICE — INTRODUCER BRITE TIP 8F 35CM

## (undated) DEVICE — KIT CUSTOM MANIFOLD

## (undated) DEVICE — CATH EMERGE MR 12 X 2.50

## (undated) DEVICE — INFLATOR ENCORE 26 BLLN INFL

## (undated) DEVICE — WIRE WHISPER MS 014 X 190

## (undated) DEVICE — PAD DEFIB CADENCE ADULT R2

## (undated) DEVICE — OMNIPAQUE 350 200ML

## (undated) DEVICE — GUIDE WIRE BMW 014 X190

## (undated) DEVICE — CATH EMERGE MR 12 X 2.00

## (undated) DEVICE — WIRE GUIDE SAFE-T-J .035 260CM

## (undated) DEVICE — CATH EMERGE MR 30 X 2.50

## (undated) DEVICE — CATH EAGLE EYE PLATINUM

## (undated) DEVICE — DRAPE ANGIO BRACH 38X44IN

## (undated) DEVICE — GUIDE LAUNCHER 6FR EBU 3.5

## (undated) DEVICE — TRAY CATH LAB OMC

## (undated) DEVICE — KIT GLIDESHEATH SLEND 6FR 10CM

## (undated) DEVICE — KIT COPILOT VALVE HEMO TOOL

## (undated) DEVICE — CATH EMERGE MR 15 X 3.00

## (undated) DEVICE — KIT PROBE COVER WITH GEL

## (undated) DEVICE — GUIDEWIRE NITINOL

## (undated) DEVICE — MICROCATHETER MAMBA FLEX 135CM

## (undated) DEVICE — TRANSDUCER ADULT DISP

## (undated) DEVICE — WIRE PILOT .014X190

## (undated) DEVICE — GUIDEWIRE OMNI J TIP 185CM

## (undated) DEVICE — CATH MINI TREK RX 2.0MM X 12MM

## (undated) DEVICE — Device

## (undated) DEVICE — GUIDEWIRE EMERALD 150CM PTFE